# Patient Record
Sex: FEMALE | Race: BLACK OR AFRICAN AMERICAN | NOT HISPANIC OR LATINO
[De-identification: names, ages, dates, MRNs, and addresses within clinical notes are randomized per-mention and may not be internally consistent; named-entity substitution may affect disease eponyms.]

---

## 2017-01-09 ENCOUNTER — RECORD ABSTRACTING (OUTPATIENT)
Age: 72
End: 2017-01-09

## 2017-01-09 DIAGNOSIS — Z92.89 PERSONAL HISTORY OF OTHER MEDICAL TREATMENT: ICD-10-CM

## 2017-07-24 ENCOUNTER — RX RENEWAL (OUTPATIENT)
Age: 72
End: 2017-07-24

## 2017-10-19 ENCOUNTER — OUTPATIENT (OUTPATIENT)
Dept: OUTPATIENT SERVICES | Facility: HOSPITAL | Age: 72
LOS: 1 days | Discharge: HOME | End: 2017-10-19

## 2017-10-19 ENCOUNTER — APPOINTMENT (OUTPATIENT)
Dept: OBGYN | Facility: CLINIC | Age: 72
End: 2017-10-19

## 2017-10-19 VITALS — SYSTOLIC BLOOD PRESSURE: 128 MMHG | DIASTOLIC BLOOD PRESSURE: 80 MMHG | BODY MASS INDEX: 21.14 KG/M2 | WEIGHT: 137 LBS

## 2017-10-19 DIAGNOSIS — Z12.31 ENCOUNTER FOR SCREENING MAMMOGRAM FOR MALIGNANT NEOPLASM OF BREAST: ICD-10-CM

## 2018-10-25 ENCOUNTER — OUTPATIENT (OUTPATIENT)
Dept: OUTPATIENT SERVICES | Facility: HOSPITAL | Age: 73
LOS: 1 days | Discharge: HOME | End: 2018-10-25

## 2018-10-25 ENCOUNTER — APPOINTMENT (OUTPATIENT)
Dept: OBGYN | Facility: CLINIC | Age: 73
End: 2018-10-25

## 2018-10-25 VITALS
SYSTOLIC BLOOD PRESSURE: 138 MMHG | DIASTOLIC BLOOD PRESSURE: 76 MMHG | BODY MASS INDEX: 21.25 KG/M2 | WEIGHT: 137 LBS | HEIGHT: 67.5 IN

## 2018-10-25 DIAGNOSIS — Z12.31 ENCOUNTER FOR SCREENING MAMMOGRAM FOR MALIGNANT NEOPLASM OF BREAST: ICD-10-CM

## 2018-10-30 ENCOUNTER — OUTPATIENT (OUTPATIENT)
Dept: OUTPATIENT SERVICES | Facility: HOSPITAL | Age: 73
LOS: 1 days | Discharge: HOME | End: 2018-10-30

## 2018-10-30 DIAGNOSIS — Z01.419 ENCOUNTER FOR GYNECOLOGICAL EXAMINATION (GENERAL) (ROUTINE) WITHOUT ABNORMAL FINDINGS: ICD-10-CM

## 2018-10-31 DIAGNOSIS — N95.9 UNSPECIFIED MENOPAUSAL AND PERIMENOPAUSAL DISORDER: ICD-10-CM

## 2018-10-31 DIAGNOSIS — Z13.820 ENCOUNTER FOR SCREENING FOR OSTEOPOROSIS: ICD-10-CM

## 2019-11-07 ENCOUNTER — OUTPATIENT (OUTPATIENT)
Dept: OUTPATIENT SERVICES | Facility: HOSPITAL | Age: 74
LOS: 1 days | Discharge: HOME | End: 2019-11-07
Payer: MEDICARE

## 2019-11-07 ENCOUNTER — OUTPATIENT (OUTPATIENT)
Dept: OUTPATIENT SERVICES | Facility: HOSPITAL | Age: 74
LOS: 1 days | Discharge: HOME | End: 2019-11-07

## 2019-11-07 ENCOUNTER — APPOINTMENT (OUTPATIENT)
Dept: OBGYN | Facility: CLINIC | Age: 74
End: 2019-11-07
Payer: MEDICARE

## 2019-11-07 VITALS
WEIGHT: 136.06 LBS | DIASTOLIC BLOOD PRESSURE: 92 MMHG | SYSTOLIC BLOOD PRESSURE: 132 MMHG | BODY MASS INDEX: 21.11 KG/M2 | HEIGHT: 67.5 IN

## 2019-11-07 DIAGNOSIS — Z12.31 ENCOUNTER FOR SCREENING MAMMOGRAM FOR MALIGNANT NEOPLASM OF BREAST: ICD-10-CM

## 2019-11-07 PROCEDURE — 77063 BREAST TOMOSYNTHESIS BI: CPT | Mod: 26

## 2019-11-07 PROCEDURE — 77067 SCR MAMMO BI INCL CAD: CPT | Mod: 26

## 2019-11-07 PROCEDURE — 99397 PER PM REEVAL EST PAT 65+ YR: CPT

## 2019-11-07 NOTE — HISTORY OF PRESENT ILLNESS
[Fair] : being in fair health [Last Mammogram ___] : Last Mammogram was [unfilled] [Last Pap ___] : Last cervical pap smear was [unfilled] [Postmenopausal] : is postmenopausal [de-identified] : up to date [Currently In Menopause] : currently in menopause [Sexually Active] : is not sexually active

## 2019-11-07 NOTE — CHIEF COMPLAINT
[Annual Visit] : annual visit [FreeTextEntry1] : Pt with a "bartholins cyst" on left labia minora. Pt has had this happen before. She is applying tea tree oil with good response.

## 2019-11-07 NOTE — PHYSICAL EXAM
[Awake] : awake [Alert] : alert [Acute Distress] : no acute distress [Mass] : no breast mass [Nipple Discharge] : no nipple discharge [Axillary LAD] : no axillary lymphadenopathy [Soft] : soft [Tender] : non tender [Oriented x3] : oriented to person, place, and time [Labia Majora] : labia major [Labia Minora] : labia minora [Normal] : clitoris [Atrophy] : atrophy [No Bleeding] : there was no active vaginal bleeding [Uterine Adnexae] : were not tender and not enlarged [No Tenderness] : no rectal tenderness [Nl Sphincter Tone] : normal sphincter tone [de-identified] : small area of vitiligo, stable over time [de-identified] : small 1/2cm cyst along left labia minora, mobile, non-tender, not fixed

## 2019-11-13 DIAGNOSIS — Z01.419 ENCOUNTER FOR GYNECOLOGICAL EXAMINATION (GENERAL) (ROUTINE) WITHOUT ABNORMAL FINDINGS: ICD-10-CM

## 2019-11-14 ENCOUNTER — RX RENEWAL (OUTPATIENT)
Age: 74
End: 2019-11-14

## 2021-01-21 ENCOUNTER — OUTPATIENT (OUTPATIENT)
Dept: OUTPATIENT SERVICES | Facility: HOSPITAL | Age: 76
LOS: 1 days | Discharge: HOME | End: 2021-01-21

## 2021-01-21 ENCOUNTER — APPOINTMENT (OUTPATIENT)
Dept: OBGYN | Facility: CLINIC | Age: 76
End: 2021-01-21
Payer: MEDICARE

## 2021-01-21 VITALS — WEIGHT: 133 LBS | SYSTOLIC BLOOD PRESSURE: 122 MMHG | BODY MASS INDEX: 20.52 KG/M2 | DIASTOLIC BLOOD PRESSURE: 80 MMHG

## 2021-01-21 PROCEDURE — 99213 OFFICE O/P EST LOW 20 MIN: CPT

## 2021-01-21 NOTE — PHYSICAL EXAM
[Appropriately responsive] : appropriately responsive [Soft] : soft [Non-tender] : non-tender [Non-distended] : non-distended [Oriented x3] : oriented x3 [Examination Of The Breasts] : a normal appearance [No Masses] : no breast masses were palpable [Labia Majora] : normal [Labia Minora] : normal [Normal] : normal [No Tenderness] : no tenderness [Uterine Adnexae] : normal [Nl Sphincter Tone] : normal sphincter tone

## 2021-01-21 NOTE — HISTORY OF PRESENT ILLNESS
[Currently In Menopause] : currently in menopause [No] : Patient does not have concerns regarding sex [FreeTextEntry1] : 76 yo P2 here for annual visit. Pt doing well. Pt denies pmb or urinary issues.

## 2021-01-21 NOTE — PLAN
[FreeTextEntry1] : Routine gyn exam. Mammo referral given. Primary care with pmd. Pt to rv in 1 year or prn

## 2021-01-23 ENCOUNTER — OUTPATIENT (OUTPATIENT)
Dept: OUTPATIENT SERVICES | Facility: HOSPITAL | Age: 76
LOS: 1 days | Discharge: HOME | End: 2021-01-23
Payer: MEDICARE

## 2021-01-23 ENCOUNTER — RESULT REVIEW (OUTPATIENT)
Age: 76
End: 2021-01-23

## 2021-01-23 DIAGNOSIS — Z12.31 ENCOUNTER FOR SCREENING MAMMOGRAM FOR MALIGNANT NEOPLASM OF BREAST: ICD-10-CM

## 2021-01-23 PROCEDURE — 77067 SCR MAMMO BI INCL CAD: CPT | Mod: 26

## 2021-01-23 PROCEDURE — 77063 BREAST TOMOSYNTHESIS BI: CPT | Mod: 26

## 2021-01-25 DIAGNOSIS — N95.9 UNSPECIFIED MENOPAUSAL AND PERIMENOPAUSAL DISORDER: ICD-10-CM

## 2021-01-25 DIAGNOSIS — Z13.820 ENCOUNTER FOR SCREENING FOR OSTEOPOROSIS: ICD-10-CM

## 2022-01-27 ENCOUNTER — APPOINTMENT (OUTPATIENT)
Dept: OBGYN | Facility: CLINIC | Age: 77
End: 2022-01-27
Payer: MEDICARE

## 2022-01-27 ENCOUNTER — RESULT REVIEW (OUTPATIENT)
Age: 77
End: 2022-01-27

## 2022-01-27 ENCOUNTER — OUTPATIENT (OUTPATIENT)
Dept: OUTPATIENT SERVICES | Facility: HOSPITAL | Age: 77
LOS: 1 days | Discharge: HOME | End: 2022-01-27

## 2022-01-27 ENCOUNTER — OUTPATIENT (OUTPATIENT)
Dept: OUTPATIENT SERVICES | Facility: HOSPITAL | Age: 77
LOS: 1 days | Discharge: HOME | End: 2022-01-27
Payer: MEDICARE

## 2022-01-27 VITALS — SYSTOLIC BLOOD PRESSURE: 157 MMHG | WEIGHT: 136 LBS | DIASTOLIC BLOOD PRESSURE: 82 MMHG | BODY MASS INDEX: 20.99 KG/M2

## 2022-01-27 DIAGNOSIS — Z12.31 ENCOUNTER FOR SCREENING MAMMOGRAM FOR MALIGNANT NEOPLASM OF BREAST: ICD-10-CM

## 2022-01-27 PROCEDURE — 77067 SCR MAMMO BI INCL CAD: CPT | Mod: 26

## 2022-01-27 PROCEDURE — 77063 BREAST TOMOSYNTHESIS BI: CPT | Mod: 26

## 2022-01-27 PROCEDURE — 99397 PER PM REEVAL EST PAT 65+ YR: CPT

## 2022-01-27 NOTE — HISTORY OF PRESENT ILLNESS
[Currently In Menopause] : currently in menopause [No] : Patient does not have concerns regarding sex [FreeTextEntry1] : 77 yo P2 here today for annual exam. Pt without gyn complaints

## 2022-01-27 NOTE — PHYSICAL EXAM
[Chaperone Present] : A chaperone was present in the examining room during all aspects of the physical examination [Appropriately responsive] : appropriately responsive [Soft] : soft [Non-tender] : non-tender [Non-distended] : non-distended [Oriented x3] : oriented x3 [Examination Of The Breasts] : a normal appearance [No Masses] : no breast masses were palpable [Labia Majora] : normal [Labia Minora] : normal [Atrophy] : atrophy [Normal] : normal [Uterine Adnexae] : normal [No Tenderness] : no tenderness [Nl Sphincter Tone] : normal sphincter tone

## 2022-01-27 NOTE — PLAN
[FreeTextEntry1] : Routine gyn\par no pap necessary \par mammo ref\par primary care with pmd\par pt to rv in 1 year or prn

## 2023-02-02 ENCOUNTER — APPOINTMENT (OUTPATIENT)
Dept: OBGYN | Facility: CLINIC | Age: 78
End: 2023-02-02
Payer: MEDICARE

## 2023-02-02 ENCOUNTER — OUTPATIENT (OUTPATIENT)
Dept: OUTPATIENT SERVICES | Facility: HOSPITAL | Age: 78
LOS: 1 days | Discharge: HOME | End: 2023-02-02

## 2023-02-02 VITALS — BODY MASS INDEX: 21.14 KG/M2 | DIASTOLIC BLOOD PRESSURE: 80 MMHG | WEIGHT: 137 LBS | SYSTOLIC BLOOD PRESSURE: 124 MMHG

## 2023-02-02 DIAGNOSIS — Z78.0 ASYMPTOMATIC MENOPAUSAL STATE: ICD-10-CM

## 2023-02-02 PROCEDURE — 99397 PER PM REEVAL EST PAT 65+ YR: CPT

## 2023-02-02 NOTE — PLAN
[FreeTextEntry1] : Routine gyn\par no pap\par routine mammo\par primary care with pmd\par pt to rv in 1 year or prn

## 2023-02-02 NOTE — HISTORY OF PRESENT ILLNESS
Left patient father a detailed message   [FreeTextEntry1] : 78 yo P2, post menopausal , here today for routine gyn visit. Pt doing well. Denies pmb, urinary issues, pelvic pain or vaginal discharge. Pt has an occasional "flicking" sensation in her left breast but it is random and she is not having it at this time.  [Currently In Menopause] : currently in menopause [No] : Patient does not have concerns regarding sex

## 2023-02-02 NOTE — PHYSICAL EXAM
[Chaperone Present] : A chaperone was present in the examining room during all aspects of the physical examination [Appropriately responsive] : appropriately responsive [Alert] : alert [No Acute Distress] : no acute distress [Soft] : soft [Non-tender] : non-tender [Non-distended] : non-distended [Oriented x3] : oriented x3 [Examination Of The Breasts] : a normal appearance [No Masses] : no breast masses were palpable [Labia Majora] : normal [Labia Minora] : normal [Atrophy] : atrophy [Normal] : normal [Uterine Adnexae] : normal [No Tenderness] : no tenderness [Nl Sphincter Tone] : normal sphincter tone

## 2023-02-08 ENCOUNTER — OUTPATIENT (OUTPATIENT)
Dept: OUTPATIENT SERVICES | Facility: HOSPITAL | Age: 78
LOS: 1 days | End: 2023-02-08
Payer: MEDICARE

## 2023-02-08 ENCOUNTER — RESULT REVIEW (OUTPATIENT)
Age: 78
End: 2023-02-08

## 2023-02-08 DIAGNOSIS — Z12.31 ENCOUNTER FOR SCREENING MAMMOGRAM FOR MALIGNANT NEOPLASM OF BREAST: ICD-10-CM

## 2023-02-08 DIAGNOSIS — Z00.8 ENCOUNTER FOR OTHER GENERAL EXAMINATION: ICD-10-CM

## 2023-02-08 PROCEDURE — 77067 SCR MAMMO BI INCL CAD: CPT | Mod: 26

## 2023-02-08 PROCEDURE — 77063 BREAST TOMOSYNTHESIS BI: CPT | Mod: 26

## 2023-02-08 PROCEDURE — 77063 BREAST TOMOSYNTHESIS BI: CPT

## 2023-02-08 PROCEDURE — 77067 SCR MAMMO BI INCL CAD: CPT

## 2023-02-21 ENCOUNTER — APPOINTMENT (OUTPATIENT)
Dept: ORTHOPEDIC SURGERY | Facility: CLINIC | Age: 78
End: 2023-02-21

## 2024-02-12 ENCOUNTER — OUTPATIENT (OUTPATIENT)
Dept: OUTPATIENT SERVICES | Facility: HOSPITAL | Age: 79
LOS: 1 days | End: 2024-02-12
Payer: MEDICARE

## 2024-02-12 ENCOUNTER — RESULT REVIEW (OUTPATIENT)
Age: 79
End: 2024-02-12

## 2024-02-12 ENCOUNTER — APPOINTMENT (OUTPATIENT)
Dept: OBGYN | Facility: CLINIC | Age: 79
End: 2024-02-12
Payer: MEDICARE

## 2024-02-12 ENCOUNTER — APPOINTMENT (OUTPATIENT)
Dept: OBGYN | Facility: CLINIC | Age: 79
End: 2024-02-12

## 2024-02-12 VITALS
HEIGHT: 67.5 IN | DIASTOLIC BLOOD PRESSURE: 82 MMHG | WEIGHT: 137 LBS | SYSTOLIC BLOOD PRESSURE: 140 MMHG | BODY MASS INDEX: 21.25 KG/M2

## 2024-02-12 DIAGNOSIS — Z00.8 ENCOUNTER FOR OTHER GENERAL EXAMINATION: ICD-10-CM

## 2024-02-12 DIAGNOSIS — Z13.820 ENCOUNTER FOR SCREENING FOR OSTEOPOROSIS: ICD-10-CM

## 2024-02-12 DIAGNOSIS — Z01.419 ENCOUNTER FOR GYNECOLOGICAL EXAMINATION (GENERAL) (ROUTINE) WITHOUT ABNORMAL FINDINGS: ICD-10-CM

## 2024-02-12 DIAGNOSIS — Z12.31 ENCOUNTER FOR SCREENING MAMMOGRAM FOR MALIGNANT NEOPLASM OF BREAST: ICD-10-CM

## 2024-02-12 DIAGNOSIS — Z01.419 ENCOUNTER FOR GYNECOLOGICAL EXAMINATION (GENERAL) (ROUTINE) W/OUT ABNORMAL FINDINGS: ICD-10-CM

## 2024-02-12 PROCEDURE — 99397 PER PM REEVAL EST PAT 65+ YR: CPT

## 2024-02-12 PROCEDURE — 77063 BREAST TOMOSYNTHESIS BI: CPT

## 2024-02-12 PROCEDURE — 77067 SCR MAMMO BI INCL CAD: CPT

## 2024-02-12 PROCEDURE — 77063 BREAST TOMOSYNTHESIS BI: CPT | Mod: 26

## 2024-02-12 PROCEDURE — 77067 SCR MAMMO BI INCL CAD: CPT | Mod: 26

## 2024-02-12 PROCEDURE — 77080 DXA BONE DENSITY AXIAL: CPT | Mod: 26

## 2024-02-12 PROCEDURE — 77080 DXA BONE DENSITY AXIAL: CPT

## 2024-02-12 NOTE — HISTORY OF PRESENT ILLNESS
[FreeTextEntry1] : 77 yo P2, post menopausal , here today for routine gyn visit. Pt doing well. Denies pmb, urinary issues, pelvic pain or vaginal discharge.  [Currently In Menopause] : currently in menopause [No] : Patient does not have concerns regarding sex

## 2024-02-12 NOTE — PLAN
[FreeTextEntry1] : Routine gyn exam. Mammo and dexa referral given. Primary care with pmd. Pt to rv in 1 year or prn

## 2024-02-13 DIAGNOSIS — Z13.820 ENCOUNTER FOR SCREENING FOR OSTEOPOROSIS: ICD-10-CM

## 2024-02-13 DIAGNOSIS — Z12.31 ENCOUNTER FOR SCREENING MAMMOGRAM FOR MALIGNANT NEOPLASM OF BREAST: ICD-10-CM

## 2024-02-14 DIAGNOSIS — Z01.419 ENCOUNTER FOR GYNECOLOGICAL EXAMINATION (GENERAL) (ROUTINE) WITHOUT ABNORMAL FINDINGS: ICD-10-CM

## 2024-02-15 ENCOUNTER — OUTPATIENT (OUTPATIENT)
Dept: OUTPATIENT SERVICES | Facility: HOSPITAL | Age: 79
LOS: 1 days | End: 2024-02-15
Payer: MEDICARE

## 2024-02-15 ENCOUNTER — RESULT REVIEW (OUTPATIENT)
Age: 79
End: 2024-02-15

## 2024-02-15 DIAGNOSIS — R92.8 OTHER ABNORMAL AND INCONCLUSIVE FINDINGS ON DIAGNOSTIC IMAGING OF BREAST: ICD-10-CM

## 2024-02-15 PROCEDURE — G0279: CPT | Mod: 26

## 2024-02-15 PROCEDURE — 76642 ULTRASOUND BREAST LIMITED: CPT | Mod: RT

## 2024-02-15 PROCEDURE — G0279: CPT

## 2024-02-15 PROCEDURE — 77065 DX MAMMO INCL CAD UNI: CPT | Mod: 26,RT

## 2024-02-15 PROCEDURE — 77065 DX MAMMO INCL CAD UNI: CPT | Mod: RT

## 2024-02-15 PROCEDURE — 76642 ULTRASOUND BREAST LIMITED: CPT | Mod: 26,RT

## 2024-02-16 DIAGNOSIS — R92.8 OTHER ABNORMAL AND INCONCLUSIVE FINDINGS ON DIAGNOSTIC IMAGING OF BREAST: ICD-10-CM

## 2024-02-27 ENCOUNTER — RESULT REVIEW (OUTPATIENT)
Age: 79
End: 2024-02-27

## 2024-02-27 ENCOUNTER — APPOINTMENT (OUTPATIENT)
Age: 79
End: 2024-02-27
Payer: MEDICARE

## 2024-02-27 ENCOUNTER — OUTPATIENT (OUTPATIENT)
Dept: OUTPATIENT SERVICES | Facility: HOSPITAL | Age: 79
LOS: 1 days | End: 2024-02-27
Payer: MEDICARE

## 2024-02-27 DIAGNOSIS — R92.8 OTHER ABNORMAL AND INCONCLUSIVE FINDINGS ON DIAGNOSTIC IMAGING OF BREAST: ICD-10-CM

## 2024-02-27 PROCEDURE — 88305 TISSUE EXAM BY PATHOLOGIST: CPT | Mod: 26

## 2024-02-27 PROCEDURE — 88305 TISSUE EXAM BY PATHOLOGIST: CPT

## 2024-02-27 PROCEDURE — 19083 BX BREAST 1ST LESION US IMAG: CPT | Mod: RT

## 2024-02-27 PROCEDURE — 77065 DX MAMMO INCL CAD UNI: CPT | Mod: 26,RT

## 2024-02-27 PROCEDURE — 77065 DX MAMMO INCL CAD UNI: CPT | Mod: RT

## 2024-02-27 PROCEDURE — 88341 IMHCHEM/IMCYTCHM EA ADD ANTB: CPT | Mod: XU

## 2024-02-27 PROCEDURE — 88360 TUMOR IMMUNOHISTOCHEM/MANUAL: CPT

## 2024-02-27 PROCEDURE — 88342 IMHCHEM/IMCYTCHM 1ST ANTB: CPT | Mod: 26

## 2024-02-27 PROCEDURE — A4648: CPT

## 2024-02-27 PROCEDURE — 88342 IMHCHEM/IMCYTCHM 1ST ANTB: CPT | Mod: XU

## 2024-02-28 ENCOUNTER — NON-APPOINTMENT (OUTPATIENT)
Age: 79
End: 2024-02-28

## 2024-02-28 LAB — SURGICAL PATHOLOGY STUDY: SIGNIFICANT CHANGE UP

## 2024-02-29 DIAGNOSIS — N63.10 UNSPECIFIED LUMP IN THE RIGHT BREAST, UNSPECIFIED QUADRANT: ICD-10-CM

## 2024-02-29 DIAGNOSIS — C50.911 MALIGNANT NEOPLASM OF UNSPECIFIED SITE OF RIGHT FEMALE BREAST: ICD-10-CM

## 2024-03-01 ENCOUNTER — NON-APPOINTMENT (OUTPATIENT)
Age: 79
End: 2024-03-01

## 2024-03-04 ENCOUNTER — NON-APPOINTMENT (OUTPATIENT)
Age: 79
End: 2024-03-04

## 2024-03-05 ENCOUNTER — APPOINTMENT (OUTPATIENT)
Dept: BREAST CENTER | Facility: CLINIC | Age: 79
End: 2024-03-05
Payer: MEDICARE

## 2024-03-05 ENCOUNTER — NON-APPOINTMENT (OUTPATIENT)
Age: 79
End: 2024-03-05

## 2024-03-05 VITALS
HEIGHT: 67 IN | WEIGHT: 135 LBS | SYSTOLIC BLOOD PRESSURE: 169 MMHG | DIASTOLIC BLOOD PRESSURE: 89 MMHG | BODY MASS INDEX: 21.19 KG/M2

## 2024-03-05 PROCEDURE — 99205 OFFICE O/P NEW HI 60 MIN: CPT

## 2024-03-05 NOTE — ASSESSMENT
[FreeTextEntry1] : Patient is a 78F with right breast cancer (+/+/pending).  She underwent bilateral scg mmg anf subsequent righ tmmg/us in 2/2024 which showed 12N10 1.9cm mass biopsied as breast cancer (stoplight) (+/+/pending).  I had a lengthy discussion with this patient regarding diagnostic results, impressions, recommendations, risks and benefits. I have explained to the patient that the needle biopsy yielded a diagnosis of invasive breast cancer and that surgical removal is necessary for definitive treatment.  Breast MRI was dicussed for further evaluation of the extent of disease/possible presence of multicentric and /or contralateral disease.  We reviewed the treatment of breast cancer, including surgery, radiation, chemotherapy, and anti-estrogen treatment. We discussed her HER 2 is still pending.  Surgical options were reviewed, including a wide excision to negative margins with SLNB and subsequent whole breast radiation versus a mastectomy with or without reconstruction. She understood that one could still have a recurrence after a mastectomy. Patient understands that her overall survival is equivalent whether she undergoes a partial mastectomy with adjuvant radiotherapy or whether she undergoes a mastectomy, as evidenced by the NSABP B-06 trial. I have explained to her that while survival rates are the same between these two options, breast conservation therapy is associated with a higher risk of local recurrence, approximately 5 to 10% over 10 years (Jonny et al. J Clin Oncol, 2017). This is compared to a local recurrence rate of 1% over 10 years following mastectomy.  Axillary staging was discussed. We reviewed the sentinel lymph node procedure, and associated risks. Patient wishes to omit a SLNB. Therefore, we discussed Choosing Wisely guidelines which would apply if she is HER 2 negative. (Rachel et al. Migdalia Surg Oncol, 2017)  The general indications for the use of adjuvant hormonal therapy, chemotherapy and targeted therapies were discussed. It was explained that medical treatments are dependent on the pathology results including the receptor status. We reviewed that her cancer is estrogen positive, and that necessitates anti-estrogen therapy for 5-10 years. We discussed that a genomic assay, Oncotype Dx, might be sent on her surgical specimen if she is HER 2 negative. We discussed if she is HER 2 positive, chemotherapy is usually indicated.  The indications for radiation therapy and common side effects were discussed. The patient will meet with a radiation oncologist if indicated. Radiation is usually needed after breast conservation. In addition, even with a mastectomy, radiation might be needed under certain  circumstances such as close margins and positive nodes. We discussed that radiotherapy is usually given Monday through Friday for 3-5 weeks, but could be longer or shorter. The patient understands that radiotherapy is typically sequenced after definitive surgery and after systemic chemotherapy, if delivered.  The genetics of breast cancer were discussed.  Patient wishes to proceed with breast conservation without sentinel node biopsy. We reviewed that the risks of the operation include, but are not limited to damage to surrounding structures, infection, bleeding, cosmetic deformity, sensory changes, need for re-excision, and anesthetic related complications.  She understands that with lumpectomy, if margins are positive, additional surgery would be required.  All questions and concerns were answered in detail.  F/U HER 2.  Patient is for right breast needle localized lumpectomy, pending HER 2.  Total time spent on encounter was greater than 60 minutes, which included face to face time with the patient, performing an exam, reviewing previous medical records including imaging/ pathology, documenting in patient record and coordinating care/imaging. Greater than 50% of the encounter was spent on counseling and coordination of her breast issue.

## 2024-03-05 NOTE — PHYSICAL EXAM
[Normocephalic] : normocephalic [EOMI] : extra ocular movement intact [No Cervical Adenopathy] : no cervical adenopathy [No Supraclavicular Adenopathy] : no supraclavicular adenopathy [Examined in the supine and seated position] : examined in the supine and seated position [No dominant masses] : no dominant masses left breast [No Nipple Retraction] : no left nipple retraction [No Nipple Discharge] : no left nipple discharge [No Axillary Lymphadenopathy] : no left axillary lymphadenopathy [No Rashes] : no rashes [No Ulceration] : no ulceration [de-identified] : 12:00 ecchymosis and post biopsy changes noted [de-identified] : Nl respirations

## 2024-03-05 NOTE — HISTORY OF PRESENT ILLNESS
[FreeTextEntry1] : ALEXYS UREÑA is a 78 year old female patient who presents today for initial surgical consultation  for newly diagnosed right breast cancer.  FHx: Denies  Her work-up is as follows: B/L Screening Mammo - 02/12/2024: -There are scattered areas of fibroglandular density. -There is an asymmetry seen in the MLO view only seen in the superior right breast at posterior third, depth region. -No suspicious mass, grouping of calcifications, or other abnormality is identified in the left breast. BI-RADS Category 0:  Incomplete: Needs Additional Imaging Evaluation  Right Uni Dx Mammo & Sono - 02/15/2024: -There is a persistent mass in the superior right breast posterior depth corresponding to the sonographic correlate below. Ultrasound: -At the 12:00 position 10 cm from the nipple, there is a heterogeneous mass measuring 1.9 x 1.6 x 1.1 cm, corresponding to mammographic findings. Findings are indeterminate and ultrasound-guided biopsy is recommended. BI-RADS Category 4: Suspicious  US Guided Core Bx - 02/27/2024: Right, 12:00 N10, 1.9cm: (stop light) - Invasive moderately differentiated mammary carcinoma with both ductal and lobular features along with associated microcalcifications. ER  (+) IA (+) HER2 pending Ki-67 = 5%  Denies any current complaints. No new changes to her breasts.

## 2024-03-05 NOTE — DATA REVIEWED
[FreeTextEntry1] : B/L Screening Mammo - 02/12/2024: MAMMOGRAM FINDINGS: Mammography was performed including the following views: bilateral craniocaudal with tomosynthesis, bilateral mediolateral oblique with tomosynthesis.  The examination includes digital synthetic 2D and digital tomosynthesis 3D images. Additional imaging analysis was performed using CAD (computer-aided detection) software.  There are scattered areas of fibroglandular density.  There is an asymmetry seen in the MLO view only seen in the superior right breast at posterior third, depth region.  No suspicious mass, grouping of calcifications, or other abnormality is identified in the left breast.  IMPRESSION: Asymmetry in the right breast requires additional evaluation.  RECOMMENDATION: Patient will be recalled for additional mammographic views and breast ultrasound.  ASSESSMENT: BI-RADS Category 0:  Incomplete: Needs Additional Imaging Evaluation   Right Uni Dx Mammo & Sono - 02/15/2024: Breast composition:There are scattered areas of fibroglandular density.  Findings:  Mammogram:  There is a persistent mass in the superior right breast posterior depth corresponding to the sonographic correlate below.  Ultrasound:  Targeted unilateral right breast ultrasound was performed.  At the 12:00 position 10 cm from the nipple, there is a heterogeneous mass measuring 1.9 x 1.6 x 1.1 cm, corresponding to mammographic findings. Findings are indeterminate and ultrasound-guided biopsy is recommended.  Impression: Indeterminate right breast mass for which ultrasound-guided biopsy is recommended.  Recommendation: Ultrasound guided biopsy.  BI-RADS Category 4: Suspicious   US Guided Core Bx - 02/27/2024: Right, 12:00 N10, 1.9cm: (stop light) - Invasive moderately differentiated mammary carcinoma with both ductal and lobular features along with associated microcalcifications. ER  (+) NV (+) HER2 Ki-67 = 5%

## 2024-03-05 NOTE — CONSULT LETTER
[Consult Letter:] : I had the pleasure of evaluating your patient, [unfilled]. [Consult Closing:] : Thank you very much for allowing me to participate in the care of this patient.  If you have any questions, please do not hesitate to contact me. [Sincerely,] : Sincerely, [FreeTextEntry3] : Laine Bird D.O

## 2024-03-07 ENCOUNTER — NON-APPOINTMENT (OUTPATIENT)
Age: 79
End: 2024-03-07

## 2024-03-15 ENCOUNTER — OUTPATIENT (OUTPATIENT)
Dept: OUTPATIENT SERVICES | Facility: HOSPITAL | Age: 79
LOS: 1 days | End: 2024-03-15
Payer: MEDICARE

## 2024-03-15 VITALS
OXYGEN SATURATION: 99 % | SYSTOLIC BLOOD PRESSURE: 143 MMHG | HEIGHT: 67 IN | WEIGHT: 134.04 LBS | TEMPERATURE: 99 F | RESPIRATION RATE: 16 BRPM | HEART RATE: 89 BPM | DIASTOLIC BLOOD PRESSURE: 81 MMHG

## 2024-03-15 DIAGNOSIS — E89.0 POSTPROCEDURAL HYPOTHYROIDISM: Chronic | ICD-10-CM

## 2024-03-15 DIAGNOSIS — C50.411 MALIGNANT NEOPLASM OF UPPER-OUTER QUADRANT OF RIGHT FEMALE BREAST: ICD-10-CM

## 2024-03-15 DIAGNOSIS — Z01.818 ENCOUNTER FOR OTHER PREPROCEDURAL EXAMINATION: ICD-10-CM

## 2024-03-15 DIAGNOSIS — N28.1 CYST OF KIDNEY, ACQUIRED: Chronic | ICD-10-CM

## 2024-03-15 LAB
ALBUMIN SERPL ELPH-MCNC: 4.9 G/DL — SIGNIFICANT CHANGE UP (ref 3.5–5.2)
ALP SERPL-CCNC: 77 U/L — SIGNIFICANT CHANGE UP (ref 30–115)
ALT FLD-CCNC: 11 U/L — SIGNIFICANT CHANGE UP (ref 0–41)
ANION GAP SERPL CALC-SCNC: 9 MMOL/L — SIGNIFICANT CHANGE UP (ref 7–14)
APTT BLD: 32.9 SEC — SIGNIFICANT CHANGE UP (ref 27–39.2)
AST SERPL-CCNC: 20 U/L — SIGNIFICANT CHANGE UP (ref 0–41)
BASOPHILS # BLD AUTO: 0.02 K/UL — SIGNIFICANT CHANGE UP (ref 0–0.2)
BASOPHILS NFR BLD AUTO: 0.5 % — SIGNIFICANT CHANGE UP (ref 0–1)
BILIRUB SERPL-MCNC: <0.2 MG/DL — SIGNIFICANT CHANGE UP (ref 0.2–1.2)
BUN SERPL-MCNC: 8 MG/DL — LOW (ref 10–20)
CALCIUM SERPL-MCNC: 9.9 MG/DL — SIGNIFICANT CHANGE UP (ref 8.4–10.5)
CHLORIDE SERPL-SCNC: 99 MMOL/L — SIGNIFICANT CHANGE UP (ref 98–110)
CO2 SERPL-SCNC: 28 MMOL/L — SIGNIFICANT CHANGE UP (ref 17–32)
CREAT SERPL-MCNC: 0.8 MG/DL — SIGNIFICANT CHANGE UP (ref 0.7–1.5)
EGFR: 74 ML/MIN/1.73M2 — SIGNIFICANT CHANGE UP
EOSINOPHIL # BLD AUTO: 0.03 K/UL — SIGNIFICANT CHANGE UP (ref 0–0.7)
EOSINOPHIL NFR BLD AUTO: 0.7 % — SIGNIFICANT CHANGE UP (ref 0–8)
GLUCOSE SERPL-MCNC: 96 MG/DL — SIGNIFICANT CHANGE UP (ref 70–99)
HCT VFR BLD CALC: 42.6 % — SIGNIFICANT CHANGE UP (ref 37–47)
HGB BLD-MCNC: 14 G/DL — SIGNIFICANT CHANGE UP (ref 12–16)
IMM GRANULOCYTES NFR BLD AUTO: 0.2 % — SIGNIFICANT CHANGE UP (ref 0.1–0.3)
INR BLD: 0.97 RATIO — SIGNIFICANT CHANGE UP (ref 0.65–1.3)
LYMPHOCYTES # BLD AUTO: 1.15 K/UL — LOW (ref 1.2–3.4)
LYMPHOCYTES # BLD AUTO: 26.8 % — SIGNIFICANT CHANGE UP (ref 20.5–51.1)
MCHC RBC-ENTMCNC: 28.9 PG — SIGNIFICANT CHANGE UP (ref 27–31)
MCHC RBC-ENTMCNC: 32.9 G/DL — SIGNIFICANT CHANGE UP (ref 32–37)
MCV RBC AUTO: 88 FL — SIGNIFICANT CHANGE UP (ref 81–99)
MONOCYTES # BLD AUTO: 0.27 K/UL — SIGNIFICANT CHANGE UP (ref 0.1–0.6)
MONOCYTES NFR BLD AUTO: 6.3 % — SIGNIFICANT CHANGE UP (ref 1.7–9.3)
NEUTROPHILS # BLD AUTO: 2.81 K/UL — SIGNIFICANT CHANGE UP (ref 1.4–6.5)
NEUTROPHILS NFR BLD AUTO: 65.5 % — SIGNIFICANT CHANGE UP (ref 42.2–75.2)
NRBC # BLD: 0 /100 WBCS — SIGNIFICANT CHANGE UP (ref 0–0)
PLATELET # BLD AUTO: 239 K/UL — SIGNIFICANT CHANGE UP (ref 130–400)
PMV BLD: 9.6 FL — SIGNIFICANT CHANGE UP (ref 7.4–10.4)
POTASSIUM SERPL-MCNC: 5.4 MMOL/L — HIGH (ref 3.5–5)
POTASSIUM SERPL-SCNC: 5.4 MMOL/L — HIGH (ref 3.5–5)
PROT SERPL-MCNC: 7.5 G/DL — SIGNIFICANT CHANGE UP (ref 6–8)
PROTHROM AB SERPL-ACNC: 11.1 SEC — SIGNIFICANT CHANGE UP (ref 9.95–12.87)
RBC # BLD: 4.84 M/UL — SIGNIFICANT CHANGE UP (ref 4.2–5.4)
RBC # FLD: 13.3 % — SIGNIFICANT CHANGE UP (ref 11.5–14.5)
SODIUM SERPL-SCNC: 136 MMOL/L — SIGNIFICANT CHANGE UP (ref 135–146)
WBC # BLD: 4.29 K/UL — LOW (ref 4.8–10.8)
WBC # FLD AUTO: 4.29 K/UL — LOW (ref 4.8–10.8)

## 2024-03-15 PROCEDURE — 85025 COMPLETE CBC W/AUTO DIFF WBC: CPT

## 2024-03-15 PROCEDURE — 93005 ELECTROCARDIOGRAM TRACING: CPT

## 2024-03-15 PROCEDURE — 93010 ELECTROCARDIOGRAM REPORT: CPT

## 2024-03-15 PROCEDURE — 36415 COLL VENOUS BLD VENIPUNCTURE: CPT

## 2024-03-15 PROCEDURE — 99214 OFFICE O/P EST MOD 30 MIN: CPT | Mod: 25

## 2024-03-15 PROCEDURE — 85610 PROTHROMBIN TIME: CPT

## 2024-03-15 PROCEDURE — 85730 THROMBOPLASTIN TIME PARTIAL: CPT

## 2024-03-15 PROCEDURE — 80053 COMPREHEN METABOLIC PANEL: CPT

## 2024-03-15 NOTE — H&P PST ADULT - LYMPHATIC
Plan: Will send Rx to [facility] Plan: Will send Rx to [facility] No lymphadedenopathy

## 2024-03-15 NOTE — H&P PST ADULT - REASON FOR ADMISSION
Patient is a _78____ year old ___female presenting to [facility] in preparation for _right breast lumpectomy with needle localization_____ on _03/29/24____ under ___LSB____ anesthesia by  __Marge_______ . Patient is a _78____ year old ___female presenting to [facility] in preparation for _right breast lumpectomy with needle localization, tissue transfer_____ on _03/29/24____ under ___LSB____ anesthesia by  __Marge_______ .

## 2024-03-15 NOTE — H&P PST ADULT - HISTORY OF PRESENT ILLNESS
FYI: pt has dental implants  -sending for medical clearance; pt might have environmental allergies; she's wearing sunglasses for puffy eyes  -pt's neck is stiff    Mallampati: 2    FOS: 1-2    Anesthesia Alert  NO--Difficult Airway  NO--History of neck surgery or radiation  stiff neck--Limited ROM of neck  NO--History of Malignant hyperthermia  NO--Personal or family history of Pseudocholinesterase deficiency.  NO--Prior Anesthesia Complication  NO--Latex Allergy  NO--Loose teeth  NO--History of Rheumatoid Arthritis  NO--MARCOS  NO--Bleeding risk  NO--Other_____    Duke Activity Status Index (DASI) from [employer]  on 3/15/2024  ** All calculations should be rechecked by clinician prior to use **    RESULT SUMMARY:  58.2 points  The higher the score (maximum 58.2), the higher the functional status.    9.89 METs        INPUTS:  Take care of self —> 2.75 = Yes  Walk indoors —> 1.75 = Yes  Walk 1&ndash;2 blocks on level ground —> 2.75 = Yes  Climb a flight of stairs or walk up a hill —> 5.5 = Yes  Run a short distance —> 8 = Yes  Do light work around the house —> 2.7 = Yes  Do moderate work around the house —> 3.5 = Yes  Do heavy work around the house —> 8 = Yes  Do yardwork —> 4.5 = Yes  Have sexual relations —> 5.25 = Yes  Participate in moderate recreational activities —> 6 = Yes  Participate in strenuous sports —> 7.5 = Yes    Revised Cardiac Risk Index for Pre-Operative Risk from [employer]  on 3/15/2024  ** All calculations should be rechecked by clinician prior to use **    RESULT SUMMARY:  0 points  Class I Risk    3.9 %  30-day risk of death, MI, or cardiac arrest    From [place]pe 2017. These numbers are higher than those from the original study (Cal 1999). See Evidence for details.      INPUTS:  Elevated-risk surgery —> 0 = No  History of ischemic heart disease —> 0 = No  History of congestive heart failure —> 0 = No  History of cerebrovascular disease —> 0 = No  Pre-operative treatment with insulin —> 0 = No  Pre-operative creatinine >2 mg/dL / 176.8 µmol/L —> 0 = No

## 2024-03-16 DIAGNOSIS — C50.411 MALIGNANT NEOPLASM OF UPPER-OUTER QUADRANT OF RIGHT FEMALE BREAST: ICD-10-CM

## 2024-03-16 DIAGNOSIS — Z01.818 ENCOUNTER FOR OTHER PREPROCEDURAL EXAMINATION: ICD-10-CM

## 2024-03-18 NOTE — REASON FOR VISIT
[Initial Evaluation] : an initial evaluation [FreeTextEntry1] : newly diagnosed right breast cancer. no pain, swelling or deformity of joints

## 2024-03-25 ENCOUNTER — OUTPATIENT (OUTPATIENT)
Dept: OUTPATIENT SERVICES | Facility: HOSPITAL | Age: 79
LOS: 1 days | End: 2024-03-25

## 2024-03-25 DIAGNOSIS — N28.1 CYST OF KIDNEY, ACQUIRED: Chronic | ICD-10-CM

## 2024-03-25 DIAGNOSIS — E89.0 POSTPROCEDURAL HYPOTHYROIDISM: Chronic | ICD-10-CM

## 2024-03-25 DIAGNOSIS — Z00.8 ENCOUNTER FOR OTHER GENERAL EXAMINATION: ICD-10-CM
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
## 2024-03-25 PROBLEM — C50.919 MALIGNANT NEOPLASM OF UNSPECIFIED SITE OF UNSPECIFIED FEMALE BREAST: Chronic | Status: ACTIVE | Noted: 2024-03-15

## 2024-03-25 PROBLEM — K21.9 GASTRO-ESOPHAGEAL REFLUX DISEASE WITHOUT ESOPHAGITIS: Chronic | Status: ACTIVE | Noted: 2024-03-15

## 2024-03-25 PROBLEM — E04.1 NONTOXIC SINGLE THYROID NODULE: Chronic | Status: ACTIVE | Noted: 2024-03-15

## 2024-03-26 DIAGNOSIS — Z00.8 ENCOUNTER FOR OTHER GENERAL EXAMINATION: ICD-10-CM
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
## 2024-03-28 NOTE — ASU PATIENT PROFILE, ADULT - MEDICATION ADMINISTRATION INFO, PROFILE
no concerns Complex Repair And Burow's Graft Text: The defect edges were debeveled with a #15 scalpel blade.  The primary defect was closed partially with a complex linear closure.  Given the location of the defect, shape of the defect, the proximity to free margins and the presence of a standing cone deformity a Burow's graft was deemed most appropriate to repair the remaining defect.  The graft was trimmed to fit the size of the remaining defect.  The graft was then placed in the primary defect, oriented appropriately, and sutured into place.

## 2024-03-28 NOTE — ASU PATIENT PROFILE, ADULT - PATIENT'S PREFERRED PRONOUN
Continue 24/7 care and support at long-term care facility for ADLs; IADLs  No behavior issues reported  Continue supportive care, reorientation, and redirection as needed  Continue delirium precautions  Monitor sleep/wake cycle  Encourage good sleep hygiene  Avoid deliriogenic medications  Monitor for pain and ensure that pain is adequately controlled  Monitor bowel and bladder status to ensure that patient is not constipated or returning urine which could cause delirium Her/She

## 2024-03-29 ENCOUNTER — RESULT REVIEW (OUTPATIENT)
Age: 79
End: 2024-03-29

## 2024-03-29 ENCOUNTER — TRANSCRIPTION ENCOUNTER (OUTPATIENT)
Age: 79
End: 2024-03-29

## 2024-03-29 ENCOUNTER — APPOINTMENT (OUTPATIENT)
Dept: BREAST CENTER | Facility: AMBULATORY SURGERY CENTER | Age: 79
End: 2024-03-29

## 2024-03-29 ENCOUNTER — OUTPATIENT (OUTPATIENT)
Dept: OUTPATIENT SERVICES | Facility: HOSPITAL | Age: 79
LOS: 1 days | Discharge: HOME | End: 2024-03-29
Payer: MEDICARE

## 2024-03-29 VITALS
WEIGHT: 134.04 LBS | HEIGHT: 67 IN | SYSTOLIC BLOOD PRESSURE: 171 MMHG | TEMPERATURE: 98 F | RESPIRATION RATE: 18 BRPM | OXYGEN SATURATION: 99 % | HEART RATE: 87 BPM | DIASTOLIC BLOOD PRESSURE: 81 MMHG

## 2024-03-29 VITALS
SYSTOLIC BLOOD PRESSURE: 129 MMHG | OXYGEN SATURATION: 100 % | HEART RATE: 70 BPM | DIASTOLIC BLOOD PRESSURE: 74 MMHG | RESPIRATION RATE: 20 BRPM

## 2024-03-29 DIAGNOSIS — C50.411 MALIGNANT NEOPLASM OF UPPER-OUTER QUADRANT OF RIGHT FEMALE BREAST: ICD-10-CM

## 2024-03-29 DIAGNOSIS — E89.0 POSTPROCEDURAL HYPOTHYROIDISM: Chronic | ICD-10-CM

## 2024-03-29 DIAGNOSIS — N28.1 CYST OF KIDNEY, ACQUIRED: Chronic | ICD-10-CM

## 2024-03-29 PROCEDURE — 19285 PERQ DEV BREAST 1ST US IMAG: CPT | Mod: RT

## 2024-03-29 PROCEDURE — 14001 TIS TRNFR TRUNK 10.1-30SQCM: CPT

## 2024-03-29 PROCEDURE — 77065 DX MAMMO INCL CAD UNI: CPT | Mod: 26,RT

## 2024-03-29 PROCEDURE — 77065 DX MAMMO INCL CAD UNI: CPT | Mod: RT

## 2024-03-29 PROCEDURE — 88305 TISSUE EXAM BY PATHOLOGIST: CPT | Mod: 26

## 2024-03-29 PROCEDURE — 88305 TISSUE EXAM BY PATHOLOGIST: CPT

## 2024-03-29 PROCEDURE — 19301 PARTIAL MASTECTOMY: CPT

## 2024-03-29 PROCEDURE — 14001 TIS TRNFR TRUNK 10.1-30SQCM: CPT | Mod: 59

## 2024-03-29 PROCEDURE — C1889: CPT

## 2024-03-29 RX ORDER — HYDROMORPHONE HYDROCHLORIDE 2 MG/ML
0.5 INJECTION INTRAMUSCULAR; INTRAVENOUS; SUBCUTANEOUS
Refills: 0 | Status: DISCONTINUED | OUTPATIENT
Start: 2024-03-29 | End: 2024-03-29

## 2024-03-29 RX ORDER — ACETAMINOPHEN 500 MG
650 TABLET ORAL ONCE
Refills: 0 | Status: DISCONTINUED | OUTPATIENT
Start: 2024-03-29 | End: 2024-03-29

## 2024-03-29 RX ORDER — HYDROMORPHONE HYDROCHLORIDE 2 MG/ML
1 INJECTION INTRAMUSCULAR; INTRAVENOUS; SUBCUTANEOUS
Refills: 0 | Status: DISCONTINUED | OUTPATIENT
Start: 2024-03-29 | End: 2024-03-29

## 2024-03-29 RX ORDER — SODIUM CHLORIDE 9 MG/ML
1000 INJECTION, SOLUTION INTRAVENOUS
Refills: 0 | Status: DISCONTINUED | OUTPATIENT
Start: 2024-03-29 | End: 2024-03-29

## 2024-03-29 RX ORDER — ONDANSETRON 8 MG/1
4 TABLET, FILM COATED ORAL ONCE
Refills: 0 | Status: DISCONTINUED | OUTPATIENT
Start: 2024-03-29 | End: 2024-03-29

## 2024-03-29 RX ADMIN — SODIUM CHLORIDE 100 MILLILITER(S): 9 INJECTION, SOLUTION INTRAVENOUS at 13:32

## 2024-03-29 NOTE — ASU DISCHARGE PLAN (ADULT/PEDIATRIC) - CARE PROVIDER_API CALL
Laine Bird  Surgery  [address] [place] [address], [address] [place] [address]  Phone: [phone]  Fax: [phone]  Follow Up Time: 2 weeks

## 2024-03-29 NOTE — ASU DISCHARGE PLAN (ADULT/PEDIATRIC) - ASU DC SPECIAL INSTRUCTIONSFT
SURGERY DISCHARGE INSTRUCTIONS    FOLLOW-UP - with Dr. Bird in 2 weeks. Call the office to make an appointment or if you have any questions/concerns.    DIET - regular.     ACTIVITY- No heavy lifting for 4-6 wks over 10-20 lbs. Walking is encouraged. No running or swimming. No driving while taking pain medication.    WOUNDCARE - Some drainage from your incisions or drain sites is normal. If you have drainage from an open incision or drain site- cover loosely with sterile gauze and tape and change daily.  You have no bandages but have purple glue over your incisions instead, this will come off with time. May shower 24 hours after surgery but no submerging wound under water for 2 weeks (tub bathing). Pat area dry when wet, keep clean and dry. Do not apply powders or lotion to wound area.     PAIN MEDS - Take over the counter extra strength tylenol 500mg and/or ibprofen 400mg with food every 6 hours for pain. No more than 4g of tylenol in 24hrs or 1g in 4 hrs.      OTHER MEDS - If you have any questions about your other regular home medications please call your primary care physician or the physician who prescribed those medications to you.     If you develop fever, dizziness, chest pain, trouble breathing, nausea, vomiting, increasing abdominal pain, inability to pass bowel movements, redness/pain/discharge from incisions. Please call the office or go to the emergency room immediately.

## 2024-03-29 NOTE — BRIEF OPERATIVE NOTE - NSICDXBRIEFPROCEDURE_GEN_ALL_CORE_FT
PROCEDURES:  Lumpectomy of right breast after needle localization 29-Mar-2024 13:29:51  Jarret Hawthorne

## 2024-03-29 NOTE — BRIEF OPERATIVE NOTE - NSEVIDENCEINFORABS_GEN_ALL_CORE
History  Chief Complaint   Patient presents with    Possible UTI     PT reports urinary frequency and burning when finsihing urinating  Pt states she also noticed a small amount of blood in urine appr  30 minutes ago  Denies any lower quadrant pain  Note was created in error, I did not see this pt or preform hx or PE  Karly Kc Please see separate note by Noemy Cash      History provided by:  Patient   used: No        None       History reviewed  No pertinent past medical history  Past Surgical History:   Procedure Laterality Date    BLADDER REPAIR      KIDNEY STONE SURGERY      OVARIAN CYST REMOVAL Right        Family History   Problem Relation Age of Onset    Diabetes Father      I have reviewed and agree with the history as documented      E-Cigarette/Vaping     E-Cigarette/Vaping Substances    Nicotine No     THC No     CBD No     Flavoring No     Other No     Unknown No      Social History     Tobacco Use    Smoking status: Current Some Day Smoker    Smokeless tobacco: Never Used    Tobacco comment: hookah   Vaping Use    Vaping Use: Not on file   Substance Use Topics    Alcohol use: Not Currently    Drug use: Yes     Types: Marijuana     Comment: social       Review of Systems    Physical Exam  Physical Exam    Vital Signs  ED Triage Vitals [04/24/22 2033]   Temperature Pulse Respirations Blood Pressure SpO2   98 9 °F (37 2 °C) 88 12 128/78 100 %      Temp Source Heart Rate Source Patient Position - Orthostatic VS BP Location FiO2 (%)   Tympanic Monitor Sitting Left arm --      Pain Score       --           Vitals:    04/24/22 2033   BP: 128/78   Pulse: 88   Patient Position - Orthostatic VS: Sitting         Visual Acuity      ED Medications  Medications   acetaminophen (TYLENOL) tablet 650 mg (has no administration in time range)   phenazopyridine (PYRIDIUM) tablet 100 mg (has no administration in time range)       Diagnostic Studies  Results Reviewed     Procedure Component Value Units Date/Time    UA (URINE) with reflex to Scope [[id number]]     Lab Status: No result Specimen: Urine     POCT pregnancy, urine [[id number]]     Lab Status: No result                  No orders to display              Procedures  Procedures         ED Course                               SBIRT 22yo+      Most Recent Value   SBIRT (22 yo +)    In order to provide better care to our patients, we are screening all of our patients for alcohol and drug use  Would it be okay to ask you these screening questions? No Filed at: 04/24/2022 2107                    MDM    Disposition  Final diagnoses:   None     ED Disposition     None      Follow-up Information    None         Patient's Medications    No medications on file       No discharge procedures on file      PDMP Review     None          ED Provider  Electronically Signed by           Nathalie Meza PA-C  04/24/22 2109       Nathalie Meza PA-C  04/24/22 2110 No

## 2024-03-29 NOTE — CHART NOTE - NSCHARTNOTEFT_GEN_A_CORE
PACU ANESTHESIA ADMISSION NOTE      Procedure:   Post op diagnosis:      ____  Intubated  TV:______       Rate: ______      FiO2: ______    _x___  Patent Airway    __x__  Full return of protective reflexes    _x___  Full recovery from anesthesia / back to baseline     Vitals:   T: 98          R:   12               BP: 121/87                 Sat:  99                 P: 80      Mental Status:  ___x_ Awake   __x___ Alert   _____ Drowsy   _____ Sedated    Nausea/Vomiting:  __x__ NO  ______Yes,   See Post - Op Orders          Pain Scale (0-10):  _____    Treatment: __x__ None    ____ See Post - Op/PCA Orders    Post - Operative Fluids:   ____ Oral   _x___ See Post - Op Orders    Plan: Discharge:   ____Home       __x___Floor     _____Critical Care    _____  Other:_________________    Comments:

## 2024-04-01 PROBLEM — N28.1 CYST OF KIDNEY, ACQUIRED: Chronic | Status: ACTIVE | Noted: 2024-03-15

## 2024-04-01 LAB — SURGICAL PATHOLOGY STUDY: SIGNIFICANT CHANGE UP

## 2024-04-02 ENCOUNTER — NON-APPOINTMENT (OUTPATIENT)
Age: 79
End: 2024-04-02

## 2024-04-03 ENCOUNTER — OUTPATIENT (OUTPATIENT)
Dept: OUTPATIENT SERVICES | Facility: HOSPITAL | Age: 79
LOS: 1 days | End: 2024-04-03
Payer: MEDICARE

## 2024-04-03 ENCOUNTER — APPOINTMENT (OUTPATIENT)
Dept: PODIATRY | Facility: CLINIC | Age: 79
End: 2024-04-03
Payer: MEDICARE

## 2024-04-03 ENCOUNTER — APPOINTMENT (OUTPATIENT)
Dept: GASTROENTEROLOGY | Facility: CLINIC | Age: 79
End: 2024-04-03
Payer: MEDICARE

## 2024-04-03 VITALS
HEART RATE: 78 BPM | OXYGEN SATURATION: 98 % | SYSTOLIC BLOOD PRESSURE: 162 MMHG | DIASTOLIC BLOOD PRESSURE: 104 MMHG | TEMPERATURE: 97.6 F

## 2024-04-03 DIAGNOSIS — N28.1 CYST OF KIDNEY, ACQUIRED: Chronic | ICD-10-CM

## 2024-04-03 DIAGNOSIS — M25.571 PAIN IN RIGHT ANKLE AND JOINTS OF RIGHT FOOT: ICD-10-CM

## 2024-04-03 DIAGNOSIS — Z00.00 ENCOUNTER FOR GENERAL ADULT MEDICAL EXAMINATION WITHOUT ABNORMAL FINDINGS: ICD-10-CM

## 2024-04-03 DIAGNOSIS — M19.079 PRIMARY OSTEOARTHRITIS, UNSPECIFIED ANKLE AND FOOT: ICD-10-CM

## 2024-04-03 DIAGNOSIS — Z78.9 OTHER SPECIFIED HEALTH STATUS: ICD-10-CM
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
## 2024-04-03 DIAGNOSIS — K21.9 GASTRO-ESOPHAGEAL REFLUX DISEASE W/OUT ESOPHAGITIS: ICD-10-CM

## 2024-04-03 DIAGNOSIS — E89.0 POSTPROCEDURAL HYPOTHYROIDISM: Chronic | ICD-10-CM
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
## 2024-04-03 PROCEDURE — 99203 OFFICE O/P NEW LOW 30 MIN: CPT

## 2024-04-03 PROCEDURE — 99204 OFFICE O/P NEW MOD 45 MIN: CPT

## 2024-04-03 PROCEDURE — 73610 X-RAY EXAM OF ANKLE: CPT | Mod: 26,RT

## 2024-04-03 PROCEDURE — 73610 X-RAY EXAM OF ANKLE: CPT | Mod: RT

## 2024-04-03 RX ORDER — FAMOTIDINE 40 MG/1
40 TABLET, FILM COATED ORAL
Qty: 30 | Refills: 3 | Status: ACTIVE | COMMUNITY
Start: 2024-04-03 | End: 1900-01-01

## 2024-04-03 NOTE — ASSESSMENT
[FreeTextEntry1] : 79 y/o female, pmhx of GERD and newly diagnosed breast cancer, presented to the clinic for worsening GERD symptoms.  #GERD - Patient used to follow with Dr Sandoval for her GERD - she was on omeprazole, discontinued as did not improve. - Patient has severe heartburn and hoarseness, especially at night - No dysphagia, nausea, vomiting, regurgitation, recent weight lost, abdominal pain, diarrhea, constipation, melena. -  Per patient, had colonoscopy and EGD in 2019, colonoscopy normal repeat in 10 years and EGD showed hiatal hernia and pathology positive for HPylori, treated and -ve stool Ag. Plan: - Start famotidine 40mg at bedtime - Will plan for EGD with bravo capsule - stop famotidine 5-days prior to EGD - Advised on lifestyle changes for GERD

## 2024-04-03 NOTE — PHYSICAL EXAM
[Alert] : alert [Sclera] : the sclera and conjunctiva were normal [Hearing Threshold Finger Rub Not [place]] : hearing was normal [Normal Lips/Gums] : the lips and gums were normal [Normal Appearance] : the appearance of the neck was normal [Oropharynx] : the oropharynx was normal [No Neck Mass] : no neck mass was observed [No Respiratory Distress] : no respiratory distress [No Acc Muscle Use] : no accessory muscle use [Respiration, Rhythm And Depth] : normal respiratory rhythm and effort [Auscultation Breath Sounds / Voice Sounds] : lungs were clear to auscultation bilaterally [Heart Rate And Rhythm] : heart rate was normal and rhythm regular [Normal S1, S2] : normal S1 and S2 [Murmurs] : no murmurs [Bowel Sounds] : normal bowel sounds [Abdomen Tenderness] : non-tender [No Masses] : no abdominal mass palpated [Abdomen Soft] : soft [Oriented To Time, Place, And Person] : oriented to person, place, and time [] : no hepatosplenomegaly

## 2024-04-03 NOTE — HISTORY OF PRESENT ILLNESS
[FreeTextEntry1] : 77 y/o female, pmhx of GERD and newly diagnosed breast cancer, presented to the clinic for worsening GERD symptoms. Patient used to follow with Dr Sandoval for her GERD, she was on omeprazole and stopped because did not improve. Patient states she has severe heartburn, especially at night associated with hoarsness. Denies dysphagia, nausea, vomiting, regurgitation, recent weight lost, abdominal pain, diarrhea, constipation, melena. Patient reports colonoscopy and EGD in 2019, colonoscopy normal repeat in 10 years repeat in 2029 and EGD showed hiatal hernia, does not have the records.

## 2024-04-04 DIAGNOSIS — Z91.040 LATEX ALLERGY STATUS: ICD-10-CM

## 2024-04-04 DIAGNOSIS — C50.911 MALIGNANT NEOPLASM OF UNSPECIFIED SITE OF RIGHT FEMALE BREAST: ICD-10-CM

## 2024-04-04 DIAGNOSIS — C50.811 MALIGNANT NEOPLASM OF OVERLAPPING SITES OF RIGHT FEMALE BREAST: ICD-10-CM

## 2024-04-04 DIAGNOSIS — M25.571 PAIN IN RIGHT ANKLE AND JOINTS OF RIGHT FOOT: ICD-10-CM

## 2024-04-04 DIAGNOSIS — K21.9 GASTRO-ESOPHAGEAL REFLUX DISEASE WITHOUT ESOPHAGITIS: ICD-10-CM

## 2024-04-04 DIAGNOSIS — N64.2 ATROPHY OF BREAST: ICD-10-CM

## 2024-04-04 DIAGNOSIS — N64.89 OTHER SPECIFIED DISORDERS OF BREAST: ICD-10-CM

## 2024-04-04 DIAGNOSIS — E89.0 POSTPROCEDURAL HYPOTHYROIDISM: ICD-10-CM

## 2024-04-04 PROBLEM — M19.079 ANKLE ARTHRITIS: Status: ACTIVE | Noted: 2024-04-04

## 2024-04-04 NOTE — END OF VISIT
[] : Resident [FreeTextEntry3] : chronic right ankle pain. likely secondary to arthritis referred for xrays recommend ankle brace

## 2024-04-04 NOTE — ASSESSMENT
[Verbal] : verbal [Patient] : patient [Good - alert, interested, motivated] : Good - alert, interested, motivated [Demonstrates independently] : demonstrates independently [FreeTextEntry1] : Assessment: -R ankle sprain v. soft tissue injury  Plan: -Pt seen & evaluated -Discussed w/pt that current sx likely unrelated to prior injury -Rx AXR-R -Rx for R ankle gauntlet dispensed -WBAT normal shoegear -RTC PRN

## 2024-04-04 NOTE — PHYSICAL EXAM
[General Appearance - In No Acute Distress] : in no acute distress [General Appearance - Alert] : alert [General Appearance - Well Nourished] : well nourished [General Appearance - Well Developed] : well developed [Ankle Swelling Bilaterally] : bilaterally  [2+] : left foot dorsalis pedis 2+ [Skin Color & Pigmentation] : normal skin color and pigmentation [Sensation] : the sensory exam was normal to light touch and pinprick [Oriented To Time, Place, And Person] : oriented to person, place, and time [Impaired Insight] : insight and judgment were intact [Affect] : the affect was normal [Mood] : the mood was normal [Ankle Swelling (On Exam)] : not present [Varicose Veins Of Lower Extremities] : not present [] : not present [Delayed in the Right Toes] : capillary refills normal in right toes [Delayed in the Left Toes] : capillary refills normal in the left toes [de-identified] : No gross skeletal deformities.  TTP to ATFL > CFL. No TTP PTFL Gastroc equinus BL

## 2024-04-04 NOTE — HISTORY OF PRESENT ILLNESS
[FreeTextEntry1] : CC: "R ankle pain" HPI: -78F c/o R ankle pain, swelling, & redness x1 month. -Sx have not progressed -4-5y ago hit R forefoot on a door, noted bruising & swelling. Was able to walk afterwards. Did not seek tx.  -Pain 1-2/10, worse when in bed. Intermittent, rare, pain when walking.  -Does passive exercises in bed at night, reports some relief -No change in shoegear recently -Usually wears sneakers. Notes walking differently now (describes pronatory-type gait) -Also reports mild "discomfort" radiating up leg and into back of thigh -Has not tried any tx -No other pedal complaints.

## 2024-04-04 NOTE — REVIEW OF SYSTEMS
[Limb Pain] : limb pain [Limb Swelling] : limb swelling [Negative] : Neurological [Arthralgias] : no arthralgias [Joint Swelling] : no joint swelling [Joint Stiffness] : no joint stiffness

## 2024-04-10 DIAGNOSIS — K21.9 GASTRO-ESOPHAGEAL REFLUX DISEASE WITHOUT ESOPHAGITIS: ICD-10-CM

## 2024-04-10 DIAGNOSIS — Z78.9 OTHER SPECIFIED HEALTH STATUS: ICD-10-CM

## 2024-04-15 ENCOUNTER — APPOINTMENT (OUTPATIENT)
Dept: BREAST CENTER | Facility: CLINIC | Age: 79
End: 2024-04-15
Payer: MEDICARE

## 2024-04-15 VITALS
HEIGHT: 67 IN | DIASTOLIC BLOOD PRESSURE: 89 MMHG | SYSTOLIC BLOOD PRESSURE: 160 MMHG | WEIGHT: 135 LBS | BODY MASS INDEX: 21.19 KG/M2

## 2024-04-15 DIAGNOSIS — Z98.890 OTHER SPECIFIED POSTPROCEDURAL STATES: ICD-10-CM

## 2024-04-15 PROCEDURE — 99024 POSTOP FOLLOW-UP VISIT: CPT

## 2024-04-15 NOTE — PHYSICAL EXAM
[Normocephalic] : normocephalic [EOMI] : extra ocular movement intact [No Rashes] : no rashes [No Ulceration] : no ulceration [de-identified] : NL respirations [de-identified] : Incision site healing well with no signs of infection/dehiscence

## 2024-04-15 NOTE — ASSESSMENT
[FreeTextEntry1] : Patient is a 78F with right breast cancer (+/+/-) s/p excision on 3/29/24: 2.3cm IDC. pT2.  She underwent bilateral scg mmg and subsequent right mmg/us in 2/2024 which showed 12N10 1.9cm mass biopsied as breast cancer (stoplight) (+/+/-).  She is s/p excision on 3/29/24: 2.3cm IDC. pT2.  The general indications for the use of adjuvant hormonal therapy, chemotherapy and targeted therapies were discussed. It was explained that medical treatments are dependent on the pathology results including the receptor status. We reviewed that her cancer is estrogen positive, and that necessitates anti-estrogen therapy for 5 years. We discussed that a genomic assay, Oncotype Dx, will be sent on her surgical specimen.  The indications for radiation therapy and common side effects were discussed. The patient will meet with a radiation oncologist if indicated. Radiation is usually needed after breast conservation. In addition, even with a mastectomy, radiation might be needed under certain circumstances such as close margins and positive nodes. We discussed that radiotherapy is usually given Monday through Friday for 3-5 weeks, but could be longer or shorter. The patient understands that radiotherapy is typically sequenced after definitive surgery and after systemic chemotherapy, if delivered.  All questions and concerns were answered in detail.  Oncotype Dx to be sent.  Referral to medical oncology.  Referral to radiation oncology.  She is for right mmg in 9/2024.  She is for follow up after imaging, pending any interval changes.  Total time spent on encounter was greater than 20 minutes, which included face to face time with the patient, performing an exam, reviewing previous medical records including imaging/ pathology, documenting in patient record and coordinating care/imaging. Greater than 50% of the encounter was spent on counseling and coordination of her breast issue.

## 2024-04-15 NOTE — HISTORY OF PRESENT ILLNESS
[FreeTextEntry1] : ALEXYS UREÑA is a 78 year old female with right breast cancer s/p excision on 3/29/24: 2.3cm IDC. pT2, pNx, pMx.  FHx: Denies  Her work-up is as follows: B/L Screening Mammo - 02/12/2024: -There are scattered areas of fibroglandular density. -There is an asymmetry seen in the MLO view only seen in the superior right breast at posterior third, depth region. -No suspicious mass, grouping of calcifications, or other abnormality is identified in the left breast. BI-RADS Category 0:  Incomplete: Needs Additional Imaging Evaluation  Right Uni Dx Mammo & Sono - 02/15/2024: -There is a persistent mass in the superior right breast posterior depth corresponding to the sonographic correlate below. Ultrasound: -At the 12:00 position 10 cm from the nipple, there is a heterogeneous mass measuring 1.9 x 1.6 x 1.1 cm, corresponding to mammographic findings. Findings are indeterminate and ultrasound-guided biopsy is recommended. BI-RADS Category 4: Suspicious  US Guided Core Bx - 02/27/2024: Right, 12:00 N10, 1.9cm: (stop light) - Invasive moderately differentiated mammary carcinoma with both ductal and lobular features along with associated microcalcifications. ER  (+) MN (+) HER2 - Ki-67 = 5%  3/29/24: R excision - Healing prior biopsy site with invasive moderately differentiated ductal carcinoma, 2.3 cm (microscopic measurement), with apocrine features and associated microcalcifications. - Non-extensive ductal carcinoma in situ (DCIS), solid and cribriform types with calcifications, intermediate nuclear grade. - No lymphovascular or perineural invasion is identified. pT2, pNx, pMx.  Denies any current complaints. Healing well.

## 2024-04-17 ENCOUNTER — APPOINTMENT (OUTPATIENT)
Dept: RADIATION ONCOLOGY | Facility: HOSPITAL | Age: 79
End: 2024-04-17
Payer: MEDICARE

## 2024-04-17 ENCOUNTER — OUTPATIENT (OUTPATIENT)
Dept: OUTPATIENT SERVICES | Facility: HOSPITAL | Age: 79
LOS: 1 days | End: 2024-04-17
Payer: MEDICARE

## 2024-04-17 ENCOUNTER — APPOINTMENT (OUTPATIENT)
Age: 79
End: 2024-04-17
Payer: MEDICARE

## 2024-04-17 VITALS
SYSTOLIC BLOOD PRESSURE: 158 MMHG | HEART RATE: 89 BPM | DIASTOLIC BLOOD PRESSURE: 93 MMHG | BODY MASS INDEX: 21.19 KG/M2 | TEMPERATURE: 98.4 F | HEIGHT: 67 IN | WEIGHT: 135 LBS

## 2024-04-17 VITALS
RESPIRATION RATE: 16 BRPM | DIASTOLIC BLOOD PRESSURE: 88 MMHG | WEIGHT: 136 LBS | BODY MASS INDEX: 21.3 KG/M2 | HEART RATE: 71 BPM | SYSTOLIC BLOOD PRESSURE: 176 MMHG | OXYGEN SATURATION: 99 % | TEMPERATURE: 97.8 F

## 2024-04-17 DIAGNOSIS — C50.919 MALIGNANT NEOPLASM OF UNSPECIFIED SITE OF UNSPECIFIED FEMALE BREAST: ICD-10-CM

## 2024-04-17 DIAGNOSIS — E89.0 POSTPROCEDURAL HYPOTHYROIDISM: Chronic | ICD-10-CM

## 2024-04-17 DIAGNOSIS — C50.511 MALIGNANT NEOPLASM OF LOWER-OUTER QUADRANT OF RIGHT FEMALE BREAST: ICD-10-CM

## 2024-04-17 DIAGNOSIS — C50.411 MALIGNANT NEOPLASM OF UPPER-OUTER QUADRANT OF RIGHT FEMALE BREAST: ICD-10-CM

## 2024-04-17 DIAGNOSIS — N28.1 CYST OF KIDNEY, ACQUIRED: Chronic | ICD-10-CM

## 2024-04-17 PROCEDURE — G2211 COMPLEX E/M VISIT ADD ON: CPT

## 2024-04-17 PROCEDURE — 99204 OFFICE O/P NEW MOD 45 MIN: CPT

## 2024-04-17 PROCEDURE — 99205 OFFICE O/P NEW HI 60 MIN: CPT

## 2024-04-17 NOTE — VITALS
[Maximal Pain Intensity: 0/10] : 0/10 [80: Normal activity with effort; some signs or symptoms of disease.] : 78: Normal activity with effort; some signs or symptoms of disease.  [Date: ____________] : Patient's last distress assessment performed on [unfilled]. [5 - Distress Level] : Distress Level: 5 [Referred Patient  to social work for follow-up] : Patient was referred to social work for follow-up [Patient given social work contact information and resource sheet] : Patient was given social work contact information and resource sheet
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
## 2024-04-17 NOTE — ASSESSMENT
[FreeTextEntry1] : #Breast cancer: She was found to have a 2.3 cm invasive moderately differentiated ductal carcinoma, with non-extensive DCIS, ER +/AK+, Her2 negative. Ki67 was 5%. There was no lymphatic or vascular invasion noted.  US Guided Core Bx - 02/27/2024: Right, 12:00 N10, 1.9cm: (stop light) Invasive moderately differentiated mammary carcinoma with both ductal and lobular features along with associated microcalcifications.ER (+)AK (+)HER2 - Ki-67 = 5% On 3/29/2024, She underwent a lumpectomy with . She was found to have a 2.3 cm invasive moderately differentiated ductal carcinoma, with non-extensive DCIS, ER +/AK+, Her2 negative. Ki67 was 5%. There was no lymphatic or vascular invasion noted. She has been doing well since surgery. She denies any chest pain, breast discharge, weight loss, appetite changes.  Plan: Oncotype dx tests were sent by surg pending results. Pathology report was reviewed with the patient in detail. Her disease was discussed in detail and imaging was discussed. She will complete RT to left breast as directed by Rad/Onc team. Following completion of RT she can start adjuvant hormonal therapy. We discussed hormonal therapy in detail SE of aromatase inhibitors include hot flashes, night sweats, loss of bone density, cardiovascular diseases, blood clots, hair loss, hot flashes, and weight gain. Dexa 2/24 Normal bone density. Recommended to take vitamin D and calcium. Pt is agreeable and will start once RT has been completed.  Close surveillance with diag mammograms was discussed  Lifestyle changes consisting of regular exercise and healthy eating habits were stressed.  Follow-up with PCP is recommended for her increased blood pressure. Pt is due for an initial visit with Rad/Onc today regarding her radiation plan. Continue to follow up with PCP, nephrology endocrinology.

## 2024-04-17 NOTE — PHYSICAL EXAM
[Fully active, able to carry on all pre-disease performance without restriction] : Status 0 - Fully active, able to carry on all pre-disease performance without restriction [Normal] : affect appropriate [de-identified] : rt breast healing scar ;left breast skin normal no masses appreciated

## 2024-04-18 DIAGNOSIS — C50.919 MALIGNANT NEOPLASM OF UNSPECIFIED SITE OF UNSPECIFIED FEMALE BREAST: ICD-10-CM

## 2024-04-19 ENCOUNTER — NON-APPOINTMENT (OUTPATIENT)
Age: 79
End: 2024-04-19

## 2024-04-19 RX ORDER — ANASTROZOLE TABLETS 1 MG/1
1 TABLET ORAL DAILY
Qty: 90 | Refills: 1 | Status: ACTIVE | COMMUNITY
Start: 2024-04-19 | End: 1900-01-01

## 2024-04-21 NOTE — REVIEW OF SYSTEMS
[Joint Pain] : joint pain [Negative] : Genitourinary [FreeTextEntry3] : glasses [FreeTextEntry4] : sinus problems/ allergies [FreeTextEntry7] : heart burn [FreeTextEntry9] : arthritis

## 2024-04-21 NOTE — PHYSICAL EXAM
[General Appearance - Well Developed] : well developed [General Appearance - Alert] : alert [General Appearance - In No Acute Distress] : in no acute distress [] : no respiratory distress [___] : a [unfilled] ~Ucm mastectomy scar [Oriented To Time, Place, And Person] : oriented to person, place, and time [de-identified] : right breast lumpectomy healing well

## 2024-04-21 NOTE — HISTORY OF PRESENT ILLNESS
[FreeTextEntry1] : The patient is an 78 year old woman who was recently noted on screening mammogram 2/12/2024 to have "Asymmetry in the right breast that requires further evaluation, BI-RADS 0: incomplete".  Diagnostic mammogram and ultrasound on 2/15/24 showed "At the 12:00 position 10 cm from the nipple, there is a heterogeneous mass measuring 1.9 x 1.6 x 1.1 cm, corresponding to mammographic findings. Findings are indeterminate and ultrasound-guided biopsy is recommended".  On 2/27/2024, she had a biopsy of the right breast abnormality at 12 o'clock and pathology showed "Invasive moderately differentiated mammary carcinoma with both ductal and lobular features along with associated microcalcifications".   It was ER positive/MN positive /HER 2 negative.  Ki-67 index was 5%.  On 3/29/2024, She underwent a lumpectomy with .  She was found to have a 2.3 cm invasive moderately differentiated ductal carcinoma, with non-extensive DCIS, ER +/MN+, Her2 negative. Ki67 was 5%.  Surgical margins were negative.  There was no lymphatic or vascular invasion noted.  She has been doing well since surgery.  She is here to discuss radiation.  Med/Onc: Dr. De Jesus 4/17/2024 Oncotype:  pending  Workup: Mammogram 2/12/2024: IMPRESSION: Asymmetry in the right breast requires additional evaluation. RECOMMENDATION: Patient will be recalled for additional mammographic views and breast ultrasound. ASSESSMENT: BI-RADS Category 0:  Incomplete: Needs Additional Imaging Evaluation  Diagnostic Mammogram 2/15/2024: At the 12:00 position 10 cm from the nipple, there is a heterogeneous mass measuring 1.9 x 1.6 x 1.1 cm, corresponding to mammographic findings. Findings are indeterminate and ultrasound-guided biopsy is recommended. Impression: Indeterminate right breast mass for which ultrasound-guided biopsy is recommended. Recommendation: Ultrasound guided biopsy. BI-RADS Category 4: Suspicious  Ultrasound core biopsy 2/27/2024: ESTROGEN RECEPTOR                100 STRONG (3+) PROGESTERONE RECEPTOR    80 MODERATE/STRONG (2+/3+) Ki-67   5 FINAL HER2 Interpretation:    Negative Final Diagnosis Breast, right 12:00 N10 mass, ultrasound-guided needle core biopsies: - Invasive moderately differentiated mammary carcinoma with both ductal and lobular features along with associated microcalcifications.  Right breast Lumpectomy: 3/29/2024: Final Diagnosis 1.  Breast, right 12:00 N10 mass, needle localized lumpectomy: - Healing prior biopsy site with invasive moderately differentiated ductal carcinoma, 2.3 cm (microscopic measurement), with apocrine features and associated microcalcifications. - Non-extensive ductal carcinoma in situ (DCIS), solid and cribriform types with calcifications, intermediate nuclear grade. - No lymphovascular or perineural invasion is identified. - For final surgical margin status please see specimen parts # 2-#7 below. - For hormone receptor and oncoprotein/gene amplification status please see the pathology report from the prior needle core biopsy specimen ([id number]). - AJCC 8th Edition Pathologic Stage: pT2, pNx, pMx.  2.  Breast, right medial margin, excision: - Benign atrophic fatty breast tissue without histopathologic abnormality.  Negative for carcinoma.  3.  Breast, right inferior margin, excision: - Benign atrophic fatty breast tissue without histopathologic abnormality.  Negative for carcinoma.  4.  Breast, right lateral margin, excision: - Benign atrophic fatty breast tissue without histopathologic abnormality.  Negative for carcinoma.  5.  Breast, right superior margin, excision: - Benign atrophic fatty breast tissue without histopathologic abnormality.  Negative for carcinoma.  6.  Breast, right posterior margin, excision: - Benign atrophic fatty breast tissue without histopathologic abnormality.  Negative for carcinoma.  7.  Breast, right anterior margin, excision: - Benign atrophic fatty breast tissue without histopathologic abnormality.  Negative for carcinoma.  Estrogen Receptor (ER) Status:   Positive Progesterone Receptor (PgR) Status:    Positive HER2 (by in situ hybridization):    Negative (not amplified) Ki-67 Percentage of Positive Nuclei:    5%

## 2024-04-21 NOTE — END OF VISIT
[FreeTextEntry3] : MD Note: I personally performed the evaluation and management services for this patient. This includes conducting the examination, assessing all conditions, and establishing the plan of care. Today, my ACP, Susie Alvraez, was here to observe my evaluation and management services for this patient. I agree with the documentation above, which I have reviewed and edited where appropriate. [Time Spent: ___ minutes] : I have spent [unfilled] minutes of time on the encounter.

## 2024-04-21 NOTE — DISEASE MANAGEMENT
[Pathological] : TNM Stage: p [IA] : IA [FreeTextEntry4] : right breast invasive moderately differentiated ductal carcinoma, 2.3cm, ER+, SD+, Her2 negative, Ki67 5% [TTNM] : 2 [NTNM] : x [MTNM] : x

## 2024-04-25 ENCOUNTER — NON-APPOINTMENT (OUTPATIENT)
Age: 79
End: 2024-04-25

## 2024-04-26 DIAGNOSIS — C50.411 MALIGNANT NEOPLASM OF UPPER-OUTER QUADRANT OF RIGHT FEMALE BREAST: ICD-10-CM

## 2024-05-03 ENCOUNTER — OUTPATIENT (OUTPATIENT)
Dept: OUTPATIENT SERVICES | Facility: HOSPITAL | Age: 79
LOS: 1 days | End: 2024-05-03
Payer: MEDICARE

## 2024-05-03 DIAGNOSIS — E89.0 POSTPROCEDURAL HYPOTHYROIDISM: Chronic | ICD-10-CM

## 2024-05-03 DIAGNOSIS — N28.1 CYST OF KIDNEY, ACQUIRED: Chronic | ICD-10-CM

## 2024-05-03 PROCEDURE — 77333 RADIATION TREATMENT AID(S): CPT

## 2024-05-03 PROCEDURE — 77263 THER RADIOLOGY TX PLNG CPLX: CPT

## 2024-05-03 PROCEDURE — 77333 RADIATION TREATMENT AID(S): CPT | Mod: 26

## 2024-05-03 PROCEDURE — 77290 THER RAD SIMULAJ FIELD CPLX: CPT | Mod: 26

## 2024-05-03 PROCEDURE — 77290 THER RAD SIMULAJ FIELD CPLX: CPT

## 2024-05-06 DIAGNOSIS — C50.411 MALIGNANT NEOPLASM OF UPPER-OUTER QUADRANT OF RIGHT FEMALE BREAST: ICD-10-CM

## 2024-05-09 PROCEDURE — 77295 3-D RADIOTHERAPY PLAN: CPT | Mod: 26

## 2024-05-09 PROCEDURE — 77334 RADIATION TREATMENT AID(S): CPT | Mod: 26

## 2024-05-09 PROCEDURE — 77300 RADIATION THERAPY DOSE PLAN: CPT | Mod: 26

## 2024-05-15 ENCOUNTER — OUTPATIENT (OUTPATIENT)
Dept: OUTPATIENT SERVICES | Facility: HOSPITAL | Age: 79
LOS: 1 days | End: 2024-05-15

## 2024-05-15 DIAGNOSIS — E89.0 POSTPROCEDURAL HYPOTHYROIDISM: Chronic | ICD-10-CM

## 2024-05-15 DIAGNOSIS — N28.1 CYST OF KIDNEY, ACQUIRED: Chronic | ICD-10-CM

## 2024-05-15 PROCEDURE — 77280 THER RAD SIMULAJ FIELD SMPL: CPT | Mod: 26

## 2024-05-15 PROCEDURE — 77333 RADIATION TREATMENT AID(S): CPT | Mod: 26

## 2024-05-16 ENCOUNTER — OUTPATIENT (OUTPATIENT)
Dept: OUTPATIENT SERVICES | Facility: HOSPITAL | Age: 79
LOS: 1 days | End: 2024-05-16

## 2024-05-16 DIAGNOSIS — E89.0 POSTPROCEDURAL HYPOTHYROIDISM: Chronic | ICD-10-CM

## 2024-05-16 DIAGNOSIS — N28.1 CYST OF KIDNEY, ACQUIRED: Chronic | ICD-10-CM

## 2024-05-16 PROCEDURE — 77427 RADIATION TX MANAGEMENT X5: CPT

## 2024-05-17 ENCOUNTER — OUTPATIENT (OUTPATIENT)
Dept: OUTPATIENT SERVICES | Facility: HOSPITAL | Age: 79
LOS: 1 days | End: 2024-05-17

## 2024-05-17 DIAGNOSIS — C50.411 MALIGNANT NEOPLASM OF UPPER-OUTER QUADRANT OF RIGHT FEMALE BREAST: ICD-10-CM

## 2024-05-17 DIAGNOSIS — E89.0 POSTPROCEDURAL HYPOTHYROIDISM: Chronic | ICD-10-CM

## 2024-05-17 DIAGNOSIS — N28.1 CYST OF KIDNEY, ACQUIRED: Chronic | ICD-10-CM

## 2024-05-20 ENCOUNTER — OUTPATIENT (OUTPATIENT)
Dept: OUTPATIENT SERVICES | Facility: HOSPITAL | Age: 79
LOS: 1 days | End: 2024-05-20

## 2024-05-20 DIAGNOSIS — N28.1 CYST OF KIDNEY, ACQUIRED: Chronic | ICD-10-CM

## 2024-05-20 DIAGNOSIS — C50.411 MALIGNANT NEOPLASM OF UPPER-OUTER QUADRANT OF RIGHT FEMALE BREAST: ICD-10-CM

## 2024-05-20 DIAGNOSIS — E89.0 POSTPROCEDURAL HYPOTHYROIDISM: Chronic | ICD-10-CM

## 2024-05-21 ENCOUNTER — NON-APPOINTMENT (OUTPATIENT)
Age: 79
End: 2024-05-21

## 2024-05-21 ENCOUNTER — OUTPATIENT (OUTPATIENT)
Dept: OUTPATIENT SERVICES | Facility: HOSPITAL | Age: 79
LOS: 1 days | End: 2024-05-21

## 2024-05-21 VITALS
BODY MASS INDEX: 21.54 KG/M2 | SYSTOLIC BLOOD PRESSURE: 135 MMHG | TEMPERATURE: 97.7 F | OXYGEN SATURATION: 100 % | DIASTOLIC BLOOD PRESSURE: 81 MMHG | RESPIRATION RATE: 16 BRPM | HEART RATE: 79 BPM | WEIGHT: 137.5 LBS

## 2024-05-21 DIAGNOSIS — N28.1 CYST OF KIDNEY, ACQUIRED: Chronic | ICD-10-CM

## 2024-05-21 DIAGNOSIS — E89.0 POSTPROCEDURAL HYPOTHYROIDISM: Chronic | ICD-10-CM

## 2024-05-21 NOTE — DISEASE MANAGEMENT
[Pathological] : TNM Stage: p [IA] : IA [FreeTextEntry4] : right breast invasive moderately differentiated ductal carcinoma, 2.3cm, ER+, KS+, Her2 negative, Ki67 5% [TTNM] : 2 [NTNM] : x [MTNM] : x [de-identified] : 2254mGy [de-identified] : [id number] [de-identified] : Right Breast

## 2024-05-21 NOTE — REVIEW OF SYSTEMS
[Fatigue: Grade 1 - Fatigue relieved by rest] : Fatigue: Grade 1 - Fatigue relieved by rest [Breast Pain: Grade 0] : Breast Pain: Grade 0 [Dermatitis Radiation: Grade 0] : Dermatitis Radiation: Grade 0

## 2024-05-21 NOTE — HISTORY OF PRESENT ILLNESS
[FreeTextEntry1] : 5/21/2024 OTV: 4 of 5 treatments to the right breast: Patient feeling well. Mild fatigue. She has not moisturizing and so we reviewed moisturizing daily and skin care. She has follow ups scheduled for hemonc and breast surgeon. Denies any breast pain.

## 2024-05-22 ENCOUNTER — OUTPATIENT (OUTPATIENT)
Dept: OUTPATIENT SERVICES | Facility: HOSPITAL | Age: 79
LOS: 1 days | End: 2024-05-22

## 2024-05-22 DIAGNOSIS — N28.1 CYST OF KIDNEY, ACQUIRED: Chronic | ICD-10-CM

## 2024-05-22 DIAGNOSIS — E89.0 POSTPROCEDURAL HYPOTHYROIDISM: Chronic | ICD-10-CM

## 2024-05-23 ENCOUNTER — OUTPATIENT (OUTPATIENT)
Dept: OUTPATIENT SERVICES | Facility: HOSPITAL | Age: 79
LOS: 1 days | End: 2024-05-23

## 2024-05-23 DIAGNOSIS — E89.0 POSTPROCEDURAL HYPOTHYROIDISM: Chronic | ICD-10-CM

## 2024-05-23 DIAGNOSIS — N28.1 CYST OF KIDNEY, ACQUIRED: Chronic | ICD-10-CM

## 2024-05-24 DIAGNOSIS — C50.411 MALIGNANT NEOPLASM OF UPPER-OUTER QUADRANT OF RIGHT FEMALE BREAST: ICD-10-CM

## 2024-06-26 ENCOUNTER — OUTPATIENT (OUTPATIENT)
Dept: OUTPATIENT SERVICES | Facility: HOSPITAL | Age: 79
LOS: 1 days | End: 2024-06-26
Payer: MEDICARE

## 2024-06-26 ENCOUNTER — APPOINTMENT (OUTPATIENT)
Dept: RADIATION ONCOLOGY | Facility: HOSPITAL | Age: 79
End: 2024-06-26
Payer: MEDICARE

## 2024-06-26 VITALS
SYSTOLIC BLOOD PRESSURE: 153 MMHG | HEART RATE: 76 BPM | DIASTOLIC BLOOD PRESSURE: 82 MMHG | WEIGHT: 138 LBS | RESPIRATION RATE: 16 BRPM | BODY MASS INDEX: 21.61 KG/M2 | TEMPERATURE: 97.6 F | OXYGEN SATURATION: 100 %

## 2024-06-26 DIAGNOSIS — N28.1 CYST OF KIDNEY, ACQUIRED: Chronic | ICD-10-CM

## 2024-06-26 DIAGNOSIS — C50.411 MALIGNANT NEOPLASM OF UPPER-OUTER QUADRANT OF RIGHT FEMALE BREAST: ICD-10-CM

## 2024-06-26 DIAGNOSIS — Z92.3 PERSONAL HISTORY OF IRRADIATION: ICD-10-CM

## 2024-06-26 DIAGNOSIS — Z17.0 MALIGNANT NEOPLASM OF UPPER-OUTER QUADRANT OF RIGHT FEMALE BREAST: ICD-10-CM

## 2024-06-26 DIAGNOSIS — E89.0 POSTPROCEDURAL HYPOTHYROIDISM: Chronic | ICD-10-CM

## 2024-06-26 PROCEDURE — 99024 POSTOP FOLLOW-UP VISIT: CPT

## 2024-07-10 ENCOUNTER — RX RENEWAL (OUTPATIENT)
Age: 79
End: 2024-07-10

## 2024-07-16 ENCOUNTER — TRANSCRIPTION ENCOUNTER (OUTPATIENT)
Age: 79
End: 2024-07-16

## 2024-07-16 ENCOUNTER — RESULT REVIEW (OUTPATIENT)
Age: 79
End: 2024-07-16

## 2024-07-16 ENCOUNTER — OUTPATIENT (OUTPATIENT)
Dept: OUTPATIENT SERVICES | Facility: HOSPITAL | Age: 79
LOS: 1 days | Discharge: HOME | End: 2024-07-16
Payer: MEDICARE

## 2024-07-16 VITALS
HEIGHT: 67 IN | DIASTOLIC BLOOD PRESSURE: 84 MMHG | HEART RATE: 73 BPM | OXYGEN SATURATION: 99 % | SYSTOLIC BLOOD PRESSURE: 171 MMHG | TEMPERATURE: 98 F | RESPIRATION RATE: 18 BRPM | WEIGHT: 134.92 LBS

## 2024-07-16 VITALS
OXYGEN SATURATION: 95 % | SYSTOLIC BLOOD PRESSURE: 148 MMHG | DIASTOLIC BLOOD PRESSURE: 66 MMHG | TEMPERATURE: 97 F | HEART RATE: 62 BPM | RESPIRATION RATE: 18 BRPM

## 2024-07-16 DIAGNOSIS — C50.919 MALIGNANT NEOPLASM OF UNSPECIFIED SITE OF UNSPECIFIED FEMALE BREAST: ICD-10-CM

## 2024-07-16 DIAGNOSIS — Z91.040 LATEX ALLERGY STATUS: ICD-10-CM

## 2024-07-16 DIAGNOSIS — K21.9 GASTRO-ESOPHAGEAL REFLUX DISEASE WITHOUT ESOPHAGITIS: ICD-10-CM

## 2024-07-16 DIAGNOSIS — E89.0 POSTPROCEDURAL HYPOTHYROIDISM: Chronic | ICD-10-CM

## 2024-07-16 DIAGNOSIS — K31.A11 GASTRIC INTESTINAL METAPLASIA WITHOUT DYSPLASIA, INVOLVING THE ANTRUM: ICD-10-CM

## 2024-07-16 DIAGNOSIS — K29.50 UNSPECIFIED CHRONIC GASTRITIS WITHOUT BLEEDING: ICD-10-CM

## 2024-07-16 DIAGNOSIS — N28.1 CYST OF KIDNEY, ACQUIRED: Chronic | ICD-10-CM

## 2024-07-16 PROCEDURE — 88305 TISSUE EXAM BY PATHOLOGIST: CPT | Mod: 26

## 2024-07-16 PROCEDURE — C1889: CPT

## 2024-07-16 PROCEDURE — 43239 EGD BIOPSY SINGLE/MULTIPLE: CPT | Mod: XU

## 2024-07-16 PROCEDURE — 88312 SPECIAL STAINS GROUP 1: CPT | Mod: 26

## 2024-07-16 PROCEDURE — 91035 G-ESOPH REFLX TST W/ELECTROD: CPT | Mod: 26

## 2024-07-16 PROCEDURE — 88312 SPECIAL STAINS GROUP 1: CPT

## 2024-07-16 PROCEDURE — 88305 TISSUE EXAM BY PATHOLOGIST: CPT

## 2024-07-16 NOTE — H&P PST ADULT - ASSESSMENT
79 y/o female, pmhx of GERD and newly diagnosed breast cancer, presented to the clinic for worsening GERD symptoms. Here for EGD with Bravo    #GERD  - Patient used to follow with Dr Sandoval for her GERD  - she was on omeprazole, discontinued as she did not improve.  - Patient has severe heartburn and hoarseness, especially at night  - No dysphagia, nausea, vomiting, regurgitation, recent weight lost, abdominal pain, diarrhea, constipation, melena.  - Per patient, had colonoscopy and EGD in 2019, colonoscopy normal repeat in 10 years and EGD showed hiatal hernia and pathology positive for HPylori, treated and -ve stool Ag.

## 2024-07-16 NOTE — ASU DISCHARGE PLAN (ADULT/PEDIATRIC) - NS MD DC FALL RISK RISK
For information on Fall & Injury Prevention, visit: [email or web address] OR  [email or web address] OR  https://www.cdc.gov/steadi/patient.html

## 2024-07-16 NOTE — ASU DISCHARGE PLAN (ADULT/PEDIATRIC) - ASU DC SPECIAL INSTRUCTIONSFT
- Follow up with our [facility] located at [address] [place] [address]. Phone Number: [phone] - Follow up with our [facility] located at [address] [place] [address]. Phone Number: [phone]  Return Lauren on Friday.

## 2024-07-23 ENCOUNTER — APPOINTMENT (OUTPATIENT)
Age: 79
End: 2024-07-23
Payer: MEDICARE

## 2024-07-23 ENCOUNTER — OUTPATIENT (OUTPATIENT)
Dept: OUTPATIENT SERVICES | Facility: HOSPITAL | Age: 79
LOS: 1 days | End: 2024-07-23
Payer: MEDICARE

## 2024-07-23 VITALS
SYSTOLIC BLOOD PRESSURE: 159 MMHG | OXYGEN SATURATION: 100 % | HEART RATE: 70 BPM | BODY MASS INDEX: 22.4 KG/M2 | TEMPERATURE: 97.6 F | DIASTOLIC BLOOD PRESSURE: 88 MMHG | HEIGHT: 65 IN | WEIGHT: 134.44 LBS

## 2024-07-23 DIAGNOSIS — C50.411 MALIGNANT NEOPLASM OF UPPER-OUTER QUADRANT OF RIGHT FEMALE BREAST: ICD-10-CM

## 2024-07-23 DIAGNOSIS — Z17.0 MALIGNANT NEOPLASM OF UPPER-OUTER QUADRANT OF RIGHT FEMALE BREAST: ICD-10-CM

## 2024-07-23 DIAGNOSIS — E89.0 POSTPROCEDURAL HYPOTHYROIDISM: Chronic | ICD-10-CM

## 2024-07-23 DIAGNOSIS — Z98.890 OTHER SPECIFIED POSTPROCEDURAL STATES: ICD-10-CM

## 2024-07-23 DIAGNOSIS — N28.1 CYST OF KIDNEY, ACQUIRED: Chronic | ICD-10-CM

## 2024-07-23 PROCEDURE — G2211 COMPLEX E/M VISIT ADD ON: CPT

## 2024-07-23 PROCEDURE — 99214 OFFICE O/P EST MOD 30 MIN: CPT

## 2024-07-24 DIAGNOSIS — Z98.890 OTHER SPECIFIED POSTPROCEDURAL STATES: ICD-10-CM

## 2024-07-26 NOTE — ASSESSMENT
[FreeTextEntry1] : In summary this is an 78 yr old woman with RT breast Ca T2NxMx 2.3 cm invasive moderately differentiated ductal carcinoma, with non-extensive DCIS, ER +/OH+, Her2 negative. Ki67 was 5%. There was no lymphatic or vascular invasion noted.   S/P lumpectomy on 3/29/2024 ONCOTYPE Dx RS 4, risk of distant recurrence 3%, with chemo benefit <1 % S/p RT 5/24 Started Arimidex since 5/24 Plan: Continue Arimidex SE of aromatase inhibitors include hot flashes, loss of bone mass,  cardiovascular diseases, blood clots, hair loss, hot flashes, and weight gain. Dexa 2/24 Normal bone density. Recommended to take vitamin D and calcium.  Close surveillance with diag mammograms was discussed  Lifestyle changes consisting of regular exercise and healthy eating habits were stressed.

## 2024-07-26 NOTE — ASSESSMENT
[FreeTextEntry1] : In summary this is an 78 yr old woman with RT breast Ca T2NxMx 2.3 cm invasive moderately differentiated ductal carcinoma, with non-extensive DCIS, ER +/PA+, Her2 negative. Ki67 was 5%. There was no lymphatic or vascular invasion noted.   S/P lumpectomy on 3/29/2024 ONCOTYPE Dx RS 4, risk of distant recurrence 3%, with chemo benefit <1 % S/p RT 5/24 Started Arimidex since 5/24 Plan: Continue Arimidex SE of aromatase inhibitors include hot flashes, loss of bone mass,  cardiovascular diseases, blood clots, hair loss, hot flashes, and weight gain. Dexa 2/24 Normal bone density. Recommended to take vitamin D and calcium.  Close surveillance with diag mammograms was discussed  Lifestyle changes consisting of regular exercise and healthy eating habits were stressed.

## 2024-07-26 NOTE — PHYSICAL EXAM
[Fully active, able to carry on all pre-disease performance without restriction] : Status 0 - Fully active, able to carry on all pre-disease performance without restriction [Normal] : affect appropriate [de-identified] : rt breast healing scar ;left breast skin normal no masses appreciated

## 2024-07-26 NOTE — PHYSICAL EXAM
[Fully active, able to carry on all pre-disease performance without restriction] : Status 0 - Fully active, able to carry on all pre-disease performance without restriction [Normal] : affect appropriate [de-identified] : rt breast healing scar ;left breast skin normal no masses appreciated

## 2024-07-26 NOTE — REASON FOR VISIT
[Initial Consultation] : an initial consultation [Follow-Up Visit] : a follow-up [FreeTextEntry2] : Breast ca
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
## 2024-08-02 ENCOUNTER — NON-APPOINTMENT (OUTPATIENT)
Age: 79
End: 2024-08-02

## 2024-08-15 ENCOUNTER — APPOINTMENT (OUTPATIENT)
Dept: GASTROENTEROLOGY | Facility: CLINIC | Age: 79
End: 2024-08-15

## 2024-09-06 ENCOUNTER — OUTPATIENT (OUTPATIENT)
Dept: OUTPATIENT SERVICES | Facility: HOSPITAL | Age: 79
LOS: 1 days | End: 2024-09-06
Payer: MEDICARE

## 2024-09-06 ENCOUNTER — RESULT REVIEW (OUTPATIENT)
Age: 79
End: 2024-09-06

## 2024-09-06 DIAGNOSIS — N28.1 CYST OF KIDNEY, ACQUIRED: Chronic | ICD-10-CM

## 2024-09-06 DIAGNOSIS — E89.0 POSTPROCEDURAL HYPOTHYROIDISM: Chronic | ICD-10-CM

## 2024-09-06 DIAGNOSIS — R92.8 OTHER ABNORMAL AND INCONCLUSIVE FINDINGS ON DIAGNOSTIC IMAGING OF BREAST: ICD-10-CM

## 2024-09-06 DIAGNOSIS — Z98.890 OTHER SPECIFIED POSTPROCEDURAL STATES: ICD-10-CM

## 2024-09-06 PROCEDURE — G0279: CPT

## 2024-09-06 PROCEDURE — G0279: CPT | Mod: 26

## 2024-09-06 PROCEDURE — 77065 DX MAMMO INCL CAD UNI: CPT | Mod: RT

## 2024-09-06 PROCEDURE — 77065 DX MAMMO INCL CAD UNI: CPT | Mod: 26,RT

## 2024-09-07 DIAGNOSIS — R92.8 OTHER ABNORMAL AND INCONCLUSIVE FINDINGS ON DIAGNOSTIC IMAGING OF BREAST: ICD-10-CM

## 2024-09-12 NOTE — HISTORY OF PRESENT ILLNESS
[FreeTextEntry1] : ALEXYS UREÑA is a 78 year old female with right breast cancer s/p excision on 3/29/24: 2.3cm IDC. pT2, pNx, pMx.  FHx: Denies  Her work-up is as follows: B/L Screening Mammo - 02/12/2024: -There are scattered areas of fibroglandular density. -There is an asymmetry seen in the MLO view only seen in the superior right breast at posterior third, depth region. -No suspicious mass, grouping of calcifications, or other abnormality is identified in the left breast. BI-RADS Category 0:  Incomplete: Needs Additional Imaging Evaluation  Right Uni Dx Mammo & Sono - 02/15/2024: -There is a persistent mass in the superior right breast posterior depth corresponding to the sonographic correlate below. Ultrasound: -At the 12:00 position 10 cm from the nipple, there is a heterogeneous mass measuring 1.9 x 1.6 x 1.1 cm, corresponding to mammographic findings. Findings are indeterminate and ultrasound-guided biopsy is recommended. BI-RADS Category 4: Suspicious  US Guided Core Bx - 02/27/2024: Right, 12:00 N10, 1.9cm: (stop light) - Invasive moderately differentiated mammary carcinoma with both ductal and lobular features along with associated microcalcifications. ER  (+) MD (+) HER2 - Ki-67 = 5%  3/29/24: R excision - Healing prior biopsy site with invasive moderately differentiated ductal carcinoma, 2.3 cm (microscopic measurement), with apocrine features and associated microcalcifications. - Non-extensive ductal carcinoma in situ (DCIS), solid and cribriform types with calcifications, intermediate nuclear grade. - No lymphovascular or perineural invasion is identified. pT2, pNx, pMx.  Denies any current complaints. Healing well.  Rafiq De Jesus MD - Arimidex. Cristian Ricks MD - The patient received 2600 cGy/5Fx to the right breast from 5/16/24 through 5/22/24.  Most recent imaging: Right Uni Dx Mammo - 09/06/2024: -There are scattered areas of fibroglandular density. -Expected post lumpectomy changes within the right breast. No new suspicious mass or microcalcifications. Recommendation: As per post lumpectomy routine, a follow-up bilateral mammogram is recommended in 6 months. BI-RADS Category 2: Benign

## 2024-09-12 NOTE — DATA REVIEWED
[FreeTextEntry1] : Right Uni Dx Mammo - 09/06/2024: Breast composition: There are scattered areas of fibroglandular density.  FINDINGS:  MAMMOGRAM:  Expected post lumpectomy changes within the right breast. No new suspicious mass or microcalcifications.   IMPRESSION:  Expected post lumpectomy changes within the right breast.   Recommendation: As per post lumpectomy routine, a follow-up bilateral mammogram is recommended in 6 months.  BI-RADS Category 2: Benign

## 2024-09-17 ENCOUNTER — APPOINTMENT (OUTPATIENT)
Dept: BREAST CENTER | Facility: CLINIC | Age: 79
End: 2024-09-17
Payer: MEDICARE

## 2024-09-17 DIAGNOSIS — Z98.890 OTHER SPECIFIED POSTPROCEDURAL STATES: ICD-10-CM

## 2024-09-17 DIAGNOSIS — N60.12 DIFFUSE CYSTIC MASTOPATHY OF RIGHT BREAST: ICD-10-CM

## 2024-09-17 DIAGNOSIS — C50.411 MALIGNANT NEOPLASM OF UPPER-OUTER QUADRANT OF RIGHT FEMALE BREAST: ICD-10-CM

## 2024-09-17 DIAGNOSIS — Z17.0 MALIGNANT NEOPLASM OF UPPER-OUTER QUADRANT OF RIGHT FEMALE BREAST: ICD-10-CM

## 2024-09-17 DIAGNOSIS — N60.11 DIFFUSE CYSTIC MASTOPATHY OF RIGHT BREAST: ICD-10-CM

## 2024-09-17 PROCEDURE — 99214 OFFICE O/P EST MOD 30 MIN: CPT

## 2024-09-18 ENCOUNTER — APPOINTMENT (OUTPATIENT)
Dept: GASTROENTEROLOGY | Facility: CLINIC | Age: 79
End: 2024-09-18

## 2024-09-19 PROBLEM — N60.11 BILATERAL FIBROCYSTIC BREAST CHANGES: Status: ACTIVE | Noted: 2024-09-19

## 2024-11-18 ENCOUNTER — RX RENEWAL (OUTPATIENT)
Age: 79
End: 2024-11-18

## 2025-01-14 ENCOUNTER — OUTPATIENT (OUTPATIENT)
Dept: OUTPATIENT SERVICES | Facility: HOSPITAL | Age: 80
LOS: 1 days | End: 2025-01-14
Payer: MEDICARE

## 2025-01-14 ENCOUNTER — APPOINTMENT (OUTPATIENT)
Dept: RADIATION ONCOLOGY | Facility: HOSPITAL | Age: 80
End: 2025-01-14
Payer: MEDICARE

## 2025-01-14 ENCOUNTER — APPOINTMENT (OUTPATIENT)
Age: 80
End: 2025-01-14
Payer: MEDICARE

## 2025-01-14 VITALS
OXYGEN SATURATION: 99 % | HEART RATE: 86 BPM | SYSTOLIC BLOOD PRESSURE: 162 MMHG | TEMPERATURE: 97.7 F | DIASTOLIC BLOOD PRESSURE: 92 MMHG | HEIGHT: 65.75 IN | WEIGHT: 140.19 LBS | BODY MASS INDEX: 22.8 KG/M2

## 2025-01-14 VITALS
WEIGHT: 140 LBS | RESPIRATION RATE: 16 BRPM | TEMPERATURE: 97.6 F | SYSTOLIC BLOOD PRESSURE: 144 MMHG | OXYGEN SATURATION: 100 % | HEART RATE: 87 BPM | BODY MASS INDEX: 22.77 KG/M2 | DIASTOLIC BLOOD PRESSURE: 87 MMHG

## 2025-01-14 DIAGNOSIS — N28.1 CYST OF KIDNEY, ACQUIRED: Chronic | ICD-10-CM

## 2025-01-14 DIAGNOSIS — Z98.890 OTHER SPECIFIED POSTPROCEDURAL STATES: ICD-10-CM

## 2025-01-14 DIAGNOSIS — E89.0 POSTPROCEDURAL HYPOTHYROIDISM: Chronic | ICD-10-CM

## 2025-01-14 DIAGNOSIS — Z17.0 MALIGNANT NEOPLASM OF UPPER-OUTER QUADRANT OF RIGHT FEMALE BREAST: ICD-10-CM

## 2025-01-14 DIAGNOSIS — Z51.81 ENCOUNTER FOR THERAPEUTIC DRUG LVL MONITORING: ICD-10-CM

## 2025-01-14 DIAGNOSIS — C50.411 MALIGNANT NEOPLASM OF UPPER-OUTER QUADRANT OF RIGHT FEMALE BREAST: ICD-10-CM

## 2025-01-14 DIAGNOSIS — Z79.811 ENCOUNTER FOR THERAPEUTIC DRUG LVL MONITORING: ICD-10-CM

## 2025-01-14 PROCEDURE — 99213 OFFICE O/P EST LOW 20 MIN: CPT

## 2025-01-14 PROCEDURE — G2211 COMPLEX E/M VISIT ADD ON: CPT

## 2025-01-14 PROCEDURE — 99212 OFFICE O/P EST SF 10 MIN: CPT

## 2025-01-15 DIAGNOSIS — Z98.890 OTHER SPECIFIED POSTPROCEDURAL STATES: ICD-10-CM

## 2025-02-24 ENCOUNTER — APPOINTMENT (OUTPATIENT)
Dept: OBGYN | Facility: CLINIC | Age: 80
End: 2025-02-24
Payer: MEDICARE

## 2025-02-24 ENCOUNTER — OUTPATIENT (OUTPATIENT)
Dept: OUTPATIENT SERVICES | Facility: HOSPITAL | Age: 80
LOS: 1 days | End: 2025-02-24
Payer: MEDICARE

## 2025-02-24 ENCOUNTER — NON-APPOINTMENT (OUTPATIENT)
Age: 80
End: 2025-02-24

## 2025-02-24 VITALS — SYSTOLIC BLOOD PRESSURE: 136 MMHG | DIASTOLIC BLOOD PRESSURE: 80 MMHG | BODY MASS INDEX: 22.93 KG/M2 | WEIGHT: 141 LBS

## 2025-02-24 DIAGNOSIS — Z01.419 ENCOUNTER FOR GYNECOLOGICAL EXAMINATION (GENERAL) (ROUTINE) W/OUT ABNORMAL FINDINGS: ICD-10-CM

## 2025-02-24 DIAGNOSIS — N28.1 CYST OF KIDNEY, ACQUIRED: Chronic | ICD-10-CM

## 2025-02-24 DIAGNOSIS — E89.0 POSTPROCEDURAL HYPOTHYROIDISM: Chronic | ICD-10-CM

## 2025-02-24 DIAGNOSIS — Z01.419 ENCOUNTER FOR GYNECOLOGICAL EXAMINATION (GENERAL) (ROUTINE) WITHOUT ABNORMAL FINDINGS: ICD-10-CM

## 2025-02-24 PROCEDURE — 99397 PER PM REEVAL EST PAT 65+ YR: CPT

## 2025-02-25 DIAGNOSIS — Z01.419 ENCOUNTER FOR GYNECOLOGICAL EXAMINATION (GENERAL) (ROUTINE) WITHOUT ABNORMAL FINDINGS: ICD-10-CM

## 2025-03-10 ENCOUNTER — RESULT REVIEW (OUTPATIENT)
Age: 80
End: 2025-03-10

## 2025-03-10 ENCOUNTER — OUTPATIENT (OUTPATIENT)
Dept: OUTPATIENT SERVICES | Facility: HOSPITAL | Age: 80
LOS: 1 days | End: 2025-03-10
Payer: MEDICARE

## 2025-03-10 DIAGNOSIS — E89.0 POSTPROCEDURAL HYPOTHYROIDISM: Chronic | ICD-10-CM

## 2025-03-10 DIAGNOSIS — R92.8 OTHER ABNORMAL AND INCONCLUSIVE FINDINGS ON DIAGNOSTIC IMAGING OF BREAST: ICD-10-CM

## 2025-03-10 DIAGNOSIS — N28.1 CYST OF KIDNEY, ACQUIRED: Chronic | ICD-10-CM

## 2025-03-10 PROCEDURE — G0279: CPT

## 2025-03-10 PROCEDURE — 77066 DX MAMMO INCL CAD BI: CPT

## 2025-03-10 PROCEDURE — 77066 DX MAMMO INCL CAD BI: CPT | Mod: 26

## 2025-03-10 PROCEDURE — G0279: CPT | Mod: 26

## 2025-03-11 DIAGNOSIS — R92.8 OTHER ABNORMAL AND INCONCLUSIVE FINDINGS ON DIAGNOSTIC IMAGING OF BREAST: ICD-10-CM

## 2025-03-18 ENCOUNTER — NON-APPOINTMENT (OUTPATIENT)
Age: 80
End: 2025-03-18

## 2025-03-18 ENCOUNTER — APPOINTMENT (OUTPATIENT)
Dept: BREAST CENTER | Facility: CLINIC | Age: 80
End: 2025-03-18
Payer: MEDICARE

## 2025-03-18 VITALS
HEART RATE: 96 BPM | DIASTOLIC BLOOD PRESSURE: 99 MMHG | SYSTOLIC BLOOD PRESSURE: 171 MMHG | HEIGHT: 65.75 IN | WEIGHT: 135 LBS | BODY MASS INDEX: 21.95 KG/M2

## 2025-03-18 DIAGNOSIS — N60.12 DIFFUSE CYSTIC MASTOPATHY OF RIGHT BREAST: ICD-10-CM

## 2025-03-18 DIAGNOSIS — C50.411 MALIGNANT NEOPLASM OF UPPER-OUTER QUADRANT OF RIGHT FEMALE BREAST: ICD-10-CM

## 2025-03-18 DIAGNOSIS — N60.11 DIFFUSE CYSTIC MASTOPATHY OF RIGHT BREAST: ICD-10-CM

## 2025-03-18 DIAGNOSIS — Z17.0 MALIGNANT NEOPLASM OF UPPER-OUTER QUADRANT OF RIGHT FEMALE BREAST: ICD-10-CM

## 2025-03-18 DIAGNOSIS — Z98.890 OTHER SPECIFIED POSTPROCEDURAL STATES: ICD-10-CM

## 2025-03-18 PROCEDURE — 99215 OFFICE O/P EST HI 40 MIN: CPT

## 2025-03-18 PROCEDURE — G2211 COMPLEX E/M VISIT ADD ON: CPT

## 2025-07-17 ENCOUNTER — APPOINTMENT (OUTPATIENT)
Age: 80
End: 2025-07-17

## 2025-07-21 ENCOUNTER — APPOINTMENT (OUTPATIENT)
Age: 80
End: 2025-07-21
Payer: MEDICARE

## 2025-07-21 VITALS
RESPIRATION RATE: 17 BRPM | SYSTOLIC BLOOD PRESSURE: 148 MMHG | TEMPERATURE: 97.9 F | DIASTOLIC BLOOD PRESSURE: 84 MMHG | BODY MASS INDEX: 22.67 KG/M2 | OXYGEN SATURATION: 99 % | WEIGHT: 139.4 LBS | HEIGHT: 65.75 IN | HEART RATE: 87 BPM

## 2025-07-21 DIAGNOSIS — Z51.81 ENCOUNTER FOR THERAPEUTIC DRUG LVL MONITORING: ICD-10-CM

## 2025-07-21 DIAGNOSIS — Z79.811 ENCOUNTER FOR THERAPEUTIC DRUG LVL MONITORING: ICD-10-CM

## 2025-07-21 DIAGNOSIS — C50.411 MALIGNANT NEOPLASM OF UPPER-OUTER QUADRANT OF RIGHT FEMALE BREAST: ICD-10-CM

## 2025-07-21 DIAGNOSIS — Z17.0 MALIGNANT NEOPLASM OF UPPER-OUTER QUADRANT OF RIGHT FEMALE BREAST: ICD-10-CM

## 2025-07-21 PROCEDURE — 99213 OFFICE O/P EST LOW 20 MIN: CPT

## 2025-07-22 ENCOUNTER — RX RENEWAL (OUTPATIENT)
Age: 80
End: 2025-07-22

## 2025-09-16 ENCOUNTER — RESULT REVIEW (OUTPATIENT)
Age: 80
End: 2025-09-16